# Patient Record
Sex: MALE | Race: BLACK OR AFRICAN AMERICAN | NOT HISPANIC OR LATINO | Employment: FULL TIME | URBAN - METROPOLITAN AREA
[De-identification: names, ages, dates, MRNs, and addresses within clinical notes are randomized per-mention and may not be internally consistent; named-entity substitution may affect disease eponyms.]

---

## 2018-04-23 ENCOUNTER — HOSPITAL ENCOUNTER (INPATIENT)
Facility: HOSPITAL | Age: 63
LOS: 2 days | Discharge: HOME | DRG: 282 | End: 2018-04-25
Attending: EMERGENCY MEDICINE | Admitting: INTERNAL MEDICINE
Payer: MEDICARE

## 2018-04-23 DIAGNOSIS — I25.10 CAD (CORONARY ARTERY DISEASE): ICD-10-CM

## 2018-04-23 DIAGNOSIS — I21.4 NSTEMI (NON-ST ELEVATED MYOCARDIAL INFARCTION): ICD-10-CM

## 2018-04-23 DIAGNOSIS — R79.89 ELEVATED TROPONIN: Primary | ICD-10-CM

## 2018-04-23 DIAGNOSIS — R07.9 CHEST PAIN: ICD-10-CM

## 2018-04-23 DIAGNOSIS — I25.118 CORONARY ARTERY DISEASE INVOLVING NATIVE CORONARY ARTERY OF NATIVE HEART WITH OTHER FORM OF ANGINA PECTORIS: ICD-10-CM

## 2018-04-23 LAB
ALBUMIN SERPL BCP-MCNC: 3.6 G/DL
ALP SERPL-CCNC: 78 U/L
ALT SERPL W/O P-5'-P-CCNC: 7 U/L
ANION GAP SERPL CALC-SCNC: 9 MMOL/L
AST SERPL-CCNC: 12 U/L
BASOPHILS # BLD AUTO: 0.07 K/UL
BASOPHILS NFR BLD: 0.7 %
BILIRUB SERPL-MCNC: 0.4 MG/DL
BNP SERPL-MCNC: 116 PG/ML
BUN SERPL-MCNC: 13 MG/DL
CALCIUM SERPL-MCNC: 9.5 MG/DL
CHLORIDE SERPL-SCNC: 102 MMOL/L
CO2 SERPL-SCNC: 25 MMOL/L
CREAT SERPL-MCNC: 0.9 MG/DL
DIFFERENTIAL METHOD: ABNORMAL
EOSINOPHIL # BLD AUTO: 0.3 K/UL
EOSINOPHIL NFR BLD: 2.5 %
ERYTHROCYTE [DISTWIDTH] IN BLOOD BY AUTOMATED COUNT: 13.7 %
EST. GFR  (AFRICAN AMERICAN): >60 ML/MIN/1.73 M^2
EST. GFR  (NON AFRICAN AMERICAN): >60 ML/MIN/1.73 M^2
GLUCOSE SERPL-MCNC: 91 MG/DL
HCT VFR BLD AUTO: 40.6 %
HGB BLD-MCNC: 13.1 G/DL
LYMPHOCYTES # BLD AUTO: 2.9 K/UL
LYMPHOCYTES NFR BLD: 28.9 %
MCH RBC QN AUTO: 27.4 PG
MCHC RBC AUTO-ENTMCNC: 32.3 G/DL
MCV RBC AUTO: 85 FL
MONOCYTES # BLD AUTO: 0.8 K/UL
MONOCYTES NFR BLD: 7.6 %
NEUTROPHILS # BLD AUTO: 6 K/UL
NEUTROPHILS NFR BLD: 60.2 %
PLATELET # BLD AUTO: 285 K/UL
PMV BLD AUTO: 9.3 FL
POTASSIUM SERPL-SCNC: 4 MMOL/L
PROT SERPL-MCNC: 7.8 G/DL
RBC # BLD AUTO: 4.78 M/UL
SODIUM SERPL-SCNC: 136 MMOL/L
TROPONIN I SERPL DL<=0.01 NG/ML-MCNC: 0.04 NG/ML
TROPONIN I SERPL DL<=0.01 NG/ML-MCNC: 0.87 NG/ML
TROPONIN I SERPL DL<=0.01 NG/ML-MCNC: 0.87 NG/ML
WBC # BLD AUTO: 10.03 K/UL

## 2018-04-23 PROCEDURE — 83880 ASSAY OF NATRIURETIC PEPTIDE: CPT

## 2018-04-23 PROCEDURE — 99285 EMERGENCY DEPT VISIT HI MDM: CPT | Mod: 25

## 2018-04-23 PROCEDURE — A4216 STERILE WATER/SALINE, 10 ML: HCPCS | Performed by: EMERGENCY MEDICINE

## 2018-04-23 PROCEDURE — 11000001 HC ACUTE MED/SURG PRIVATE ROOM

## 2018-04-23 PROCEDURE — 84484 ASSAY OF TROPONIN QUANT: CPT | Mod: 91

## 2018-04-23 PROCEDURE — 85025 COMPLETE CBC W/AUTO DIFF WBC: CPT

## 2018-04-23 PROCEDURE — 25000003 PHARM REV CODE 250: Performed by: INTERNAL MEDICINE

## 2018-04-23 PROCEDURE — 96372 THER/PROPH/DIAG INJ SC/IM: CPT

## 2018-04-23 PROCEDURE — 80053 COMPREHEN METABOLIC PANEL: CPT

## 2018-04-23 PROCEDURE — 93005 ELECTROCARDIOGRAM TRACING: CPT

## 2018-04-23 PROCEDURE — 94761 N-INVAS EAR/PLS OXIMETRY MLT: CPT

## 2018-04-23 PROCEDURE — 63600175 PHARM REV CODE 636 W HCPCS: Performed by: INTERNAL MEDICINE

## 2018-04-23 PROCEDURE — 99223 1ST HOSP IP/OBS HIGH 75: CPT | Mod: ,,, | Performed by: INTERNAL MEDICINE

## 2018-04-23 PROCEDURE — 25000003 PHARM REV CODE 250: Performed by: EMERGENCY MEDICINE

## 2018-04-23 PROCEDURE — 84484 ASSAY OF TROPONIN QUANT: CPT

## 2018-04-23 PROCEDURE — 36415 COLL VENOUS BLD VENIPUNCTURE: CPT

## 2018-04-23 RX ORDER — ATORVASTATIN CALCIUM 40 MG/1
80 TABLET, FILM COATED ORAL NIGHTLY
Status: DISCONTINUED | OUTPATIENT
Start: 2018-04-23 | End: 2018-04-25 | Stop reason: HOSPADM

## 2018-04-23 RX ORDER — METOPROLOL TARTRATE 50 MG/1
50 TABLET ORAL
Status: COMPLETED | OUTPATIENT
Start: 2018-04-23 | End: 2018-04-23

## 2018-04-23 RX ORDER — ENOXAPARIN SODIUM 100 MG/ML
80 INJECTION SUBCUTANEOUS ONCE
Status: COMPLETED | OUTPATIENT
Start: 2018-04-23 | End: 2018-04-23

## 2018-04-23 RX ORDER — NITROGLYCERIN 0.4 MG/1
0.4 TABLET SUBLINGUAL EVERY 5 MIN PRN
Status: DISCONTINUED | OUTPATIENT
Start: 2018-04-23 | End: 2018-04-25 | Stop reason: HOSPADM

## 2018-04-23 RX ORDER — METOPROLOL TARTRATE 50 MG/1
50 TABLET ORAL 2 TIMES DAILY
Status: DISCONTINUED | OUTPATIENT
Start: 2018-04-24 | End: 2018-04-25 | Stop reason: HOSPADM

## 2018-04-23 RX ORDER — ASPIRIN 325 MG
325 TABLET ORAL ONCE
Status: COMPLETED | OUTPATIENT
Start: 2018-04-23 | End: 2018-04-23

## 2018-04-23 RX ORDER — CLOPIDOGREL BISULFATE 75 MG/1
300 TABLET ORAL
Status: COMPLETED | OUTPATIENT
Start: 2018-04-23 | End: 2018-04-23

## 2018-04-23 RX ORDER — SODIUM CHLORIDE 0.9 % (FLUSH) 0.9 %
3 SYRINGE (ML) INJECTION EVERY 8 HOURS
Status: DISCONTINUED | OUTPATIENT
Start: 2018-04-23 | End: 2018-04-25 | Stop reason: HOSPADM

## 2018-04-23 RX ORDER — ASPIRIN 325 MG
325 TABLET ORAL
Status: COMPLETED | OUTPATIENT
Start: 2018-04-23 | End: 2018-04-23

## 2018-04-23 RX ORDER — LISINOPRIL 10 MG/1
10 TABLET ORAL DAILY
Status: DISCONTINUED | OUTPATIENT
Start: 2018-04-24 | End: 2018-04-24

## 2018-04-23 RX ADMIN — SODIUM CHLORIDE, PRESERVATIVE FREE 3 ML: 5 INJECTION INTRAVENOUS at 02:04

## 2018-04-23 RX ADMIN — ENOXAPARIN SODIUM 80 MG: 100 INJECTION SUBCUTANEOUS at 06:04

## 2018-04-23 RX ADMIN — ASPIRIN 325 MG ORAL TABLET 325 MG: 325 PILL ORAL at 01:04

## 2018-04-23 RX ADMIN — CLOPIDOGREL BISULFATE 300 MG: 75 TABLET ORAL at 01:04

## 2018-04-23 RX ADMIN — ATORVASTATIN CALCIUM 80 MG: 40 TABLET, FILM COATED ORAL at 08:04

## 2018-04-23 RX ADMIN — ASPIRIN 325 MG ORAL TABLET 325 MG: 325 PILL ORAL at 11:04

## 2018-04-23 RX ADMIN — METOPROLOL TARTRATE 50 MG: 50 TABLET ORAL at 03:04

## 2018-04-23 RX ADMIN — NITROGLYCERIN 1 INCH: 20 OINTMENT TOPICAL at 11:04

## 2018-04-23 RX ADMIN — SODIUM CHLORIDE, PRESERVATIVE FREE 3 ML: 5 INJECTION INTRAVENOUS at 09:04

## 2018-04-23 NOTE — ED PROVIDER NOTES
Encounter Date: 4/23/2018    SCRIBE #1 NOTE: I, Fransico Napoles, am scribing for, and in the presence of,  Dr. Autumn Sanz. I have scribed the entire note.       History     Chief Complaint   Patient presents with    Chest Pain     chest discomfort that began this morning with right shoulder pain.  Hx of MI x 2 with cardiac stents.  Took 3 NTG at home with some relief      aWlter Gold is a 62 y.o. male who  has a past medical history of Coronary artery disease; Hyperlipidemia; and Hypertension.    The patient presents to the ED due to chest pain. This morning, pt felt right shoulder discomfort which radiates to his chest. He has had 2 MI before (2007, 2015), and states pain feels similar. Sensation is discomfort more than pain. He felt associated nausea, denies diaphoresis or SOB. Discomfort continues to present and first began 2 hours PTA. He has taken 3 NTG with some initial relief, however discomfort has returned intermittently. Pt is a smoker, light alcohol (1-2 drinks per day). Non-drug user. His cardiologist is Dr. Spann at [place]. PCP Dr. Clark.       The history is provided by the patient and the spouse.     Review of patient's allergies indicates:   Allergen Reactions    Losartan      Causes coughing, not certain if an allergy     Past Medical History:   Diagnosis Date    Coronary artery disease     Hyperlipidemia     Hypertension      History reviewed. No pertinent surgical history.  Family History   Problem Relation Age of Onset    Hyperlipidemia Mother     Heart disease Brother      Social History   Substance Use Topics    Smoking status: Former Smoker     Packs/day: 0.50     Types: Cigarettes     Start date: 6/25/2014    Smokeless tobacco: Not on file    Alcohol use Yes     Review of Systems   Constitutional: Negative for fever.   HENT: Negative for sore throat.    Respiratory: Negative for shortness of breath.    Cardiovascular: Positive for chest pain.   Gastrointestinal:  Positive for nausea.   Genitourinary: Negative for dysuria.   Musculoskeletal: Negative for back pain.        R shoulder discomfort   Skin: Negative for rash.   Neurological: Negative for weakness.   Hematological: Does not bruise/bleed easily.       Physical Exam     Initial Vitals [04/23/18 1114]   BP Pulse Resp Temp SpO2   (!) 182/93 89 16 98.6 °F (37 °C) 100 %      MAP       122.67         Physical Exam    Nursing note and vitals reviewed.  Constitutional: He appears well-developed and well-nourished.   HENT:   Head: Normocephalic and atraumatic.   Eyes: EOM are normal. Pupils are equal, round, and reactive to light.   Neck: Normal range of motion. Neck supple.   Cardiovascular: Normal rate, regular rhythm, normal heart sounds and intact distal pulses.   Pulmonary/Chest: Breath sounds normal.   Abdominal: Soft. Bowel sounds are normal. He exhibits no distension. There is no tenderness. There is no rebound and no guarding.   Musculoskeletal: Normal range of motion. He exhibits no edema.   Neurological: He is alert and oriented to person, place, and time.   Skin: Skin is warm and dry.   hyperpigmented raised growth to left chin that is approximately 4x3 cm   Psychiatric: He has a normal mood and affect. His behavior is normal. Judgment and thought content normal.         ED Course   Procedures  Labs Reviewed   CBC W/ AUTO DIFFERENTIAL - Abnormal; Notable for the following:        Result Value    Hemoglobin 13.1 (*)     All other components within normal limits   COMPREHENSIVE METABOLIC PANEL - Abnormal; Notable for the following:     ALT 7 (*)     All other components within normal limits   TROPONIN I - Abnormal; Notable for the following:     Troponin I 0.039 (*)     All other components within normal limits   B-TYPE NATRIURETIC PEPTIDE - Abnormal; Notable for the following:      (*)     All other components within normal limits   TROPONIN I   TROPONIN I     EKG Readings: (Independently Interpreted)    Rhythm: Sinus Arrhythmia. Heart Rate: 91. Ectopy: No Ectopy. Conduction: Normal. Axis: Left Axis Deviation. Other Impression: peaked T waves, LVH. No ST changes   1106:          Medical Decision Making:   History:   Old Medical Records: I decided to obtain old medical records.  Initial Assessment:   63 y/o male presents with R shoulder discomfort with radiation to the chest with associated nausea. Pt has taken 3x NTG with some relief. He was hx of MI. Will order cardiac workup   Clinical Tests:   Lab Tests: Ordered and Reviewed  The following lab test(s) were unremarkable: CBC, CMP, Troponin, BNP and Urinalysis  Radiological Study: Ordered and Reviewed  Medical Tests: Ordered and Reviewed  ED Management:  Patient with a positive troponin.  EKG does not show evidence of acute STEMI.  Chest x-ray shows possible enlarged thyroid gland radiologist recommends ultrasound.  Case discussed with Dr. Collazo, he will admit the patient to his service. Orders written.                      Clinical Impression:   The primary encounter diagnosis was Elevated troponin. A diagnosis of Chest pain was also pertinent to this visit.          I, Autumn Sanz, personally performed the services described in this documentation. All medical record entries made by the scribe were at my direction and in my presence.  I have reviewed the chart and agree that the record reflects my personal performance and is accurate and complete. Autumn Sanz M.D. 1:03 PM04/23/2018                 Autumn Sanz MD  04/23/18 4595       Autumn Sanz MD  04/23/18 5108

## 2018-04-23 NOTE — ASSESSMENT & PLAN NOTE
Returning for NSTEMI      [facility] in am  R radial   AMANUEL candidate      Risks, benefits and alternatives of cardiac catheterization and possible intervention were discussed with the patient. All questions were answered and informed consent obtained.     I discussed the importance of compliance with dual antiplatelet therapy with the patient to prevent acute or late stent thrombosis with premature discontinuation of the therapy.      Patient is a candidate for drug eluting stents.     Verbally the patient has expressed full understanding of the clinical importance of dual antiplatelet therapy compliance. Drug eluting stents require a minimum of 12 months of dual anti-platelet therapy.

## 2018-04-23 NOTE — NURSING
Patient arrived to  via stretcher with ER RN staff. Family at patient 's bedside. IV site dressing c/d/i. At present no distress noted. Tele monitor applied as per orders. For further data refer to admission assessment data sheets. Will cont to monitor pt and intervene prn.

## 2018-04-23 NOTE — ED NOTES
Pt here c/o chest pain that spans both sides of upper chest-described as similar to previous chest pain in the past-has had 2 MI. Pt denies nausea today. Denies urinary s/s. Skin is warm/dry/normal coloring for ethnicity. Pt is AAOx3,resp are regular and unlabored. Pt denies SOB. abd is soft and nontender.

## 2018-04-23 NOTE — CONSULTS
[facility]  Cardiology  Consult Note    Patient Name: Walter Gold  MRN: [id number]  Admission Date: 4/23/2018  Hospital Length of Stay: 0 days  Code Status: Full Code    Attending Provider:  Chaparro Collazo  Consulting Provider: Chaparro Collazo MD  Primary Care Physician: Joselin Clark MD  Principal Problem:<principal problem not specified>    Patient information was obtained from chart and patient      Consults  Subjective:     Chief Complaint:  Chest pain    HPI:   62 year old male with h/o HTN, HLP, CAD s/p RCA PCI twice, LCx , tobacco, and non comp compliance with medications presenting with chest pain with abnormal TNI. Last AMI and PCI 2015 4.0 x 26 mm Resolute AMANUEL in RCA.       Pain free in the ED after aspirin, plavix load, NTG paste, and lovenox.       Risks, benefits and alternatives of cardiac catheterization and possible intervention were discussed with the patient. All questions were answered and informed consent obtained.     I discussed the importance of compliance with dual antiplatelet therapy with the patient to prevent acute or late stent thrombosis with premature discontinuation of the therapy.            Past Medical History:   Diagnosis Date    Coronary artery disease     Hyperlipidemia     Hypertension        History reviewed. No pertinent surgical history.    Review of patient's allergies indicates:   Allergen Reactions    Losartan      Causes coughing, not certain if an allergy       No current facility-administered medications on file prior to encounter.      Current Outpatient Prescriptions on File Prior to Encounter   Medication Sig    nitroGLYCERIN (NITROSTAT) 0.4 MG SL tablet Place 1 tablet (0.4 mg total) under the tongue every 5 (five) minutes as needed for Chest pain.    aspirin 81 MG Chew Take 81 mg by mouth once daily.    benazepril (LOTENSIN) 20 MG tablet Take 20 mg by mouth once daily.    carvedilol (COREG) 6.25 MG tablet Take 1 tablet (6.25 mg total) by  mouth 2 (two) times daily with meals.    fenofibrate 160 MG Tab Take 1 tablet (160 mg total) by mouth once daily.    multivitamin (ONE DAILY MULTIVITAMIN) per tablet Take 1 tablet by mouth once daily.    ticagrelor (BRILINTA) 90 mg tablet Take 1 tablet (90 mg total) by mouth 2 (two) times daily.     Family History     Problem Relation (Age of Onset)    Heart disease Brother    Hyperlipidemia Mother        Social History Main Topics    Smoking status: Former Smoker     Packs/day: 0.50     Types: Cigarettes     Start date: 6/25/2014    Smokeless tobacco: Not on file    Alcohol use Yes    Drug use: No    Sexual activity: Not on file     Review of Systems   Constitution: Negative for diaphoresis, weakness, night sweats, weight gain and weight loss.   HENT: Negative for congestion.    Eyes: Negative for blurred vision, discharge and double vision.   Cardiovascular: Positive for chest pain. Negative for claudication, cyanosis, dyspnea on exertion, irregular heartbeat, leg swelling, near-syncope, orthopnea, palpitations, paroxysmal nocturnal dyspnea and syncope.   Respiratory: Negative for cough, shortness of breath and wheezing.    Endocrine: Negative for cold intolerance, heat intolerance and polyphagia.   Hematologic/Lymphatic: Negative for adenopathy and bleeding problem. Does not bruise/bleed easily.   Skin: Negative for dry skin and nail changes.   Musculoskeletal: Negative for arthritis, back pain, falls, joint pain, myalgias and neck pain.   Gastrointestinal: Negative for bloating, abdominal pain, change in bowel habit and constipation.   Genitourinary: Negative for bladder incontinence, dysuria, flank pain, genital sores and missed menses.   Neurological: Negative for aphonia, brief paralysis, difficulty with concentration and dizziness.   Psychiatric/Behavioral: Negative for altered mental status and memory loss. The patient does not have insomnia.    Allergic/Immunologic: Negative for environmental  allergies.     Objective:     Vital Signs (Most Recent):  Temp: 98.6 °F (37 °C) (04/23/18 1114)  Pulse: 89 (04/23/18 1537)  Resp: 20 (04/23/18 1537)  BP: (!) 154/89 (04/23/18 1537)  SpO2: 100 % (04/23/18 1537) Vital Signs (24h Range):  Temp:  [98.6 °F (37 °C)] 98.6 °F (37 °C)  Pulse:  [75-89] 89  Resp:  [16-20] 20  SpO2:  [99 %-100 %] 100 %  BP: (154-182)/(69-93) 154/89     Weight: 79.4 kg (175 lb)  Body mass index is 25.84 kg/m².    SpO2: 100 %  O2 Device (Oxygen Therapy): room air    No intake or output data in the 24 hours ending 04/23/18 1615    Lines/Drains/Airways     Peripheral Intravenous Line                 Peripheral IV - Single Lumen 04/23/18 1145 Left Antecubital less than 1 day                Physical Exam   Constitutional: He is oriented to person, place, and time. He appears well-developed and well-nourished. He is not intubated.   HENT:   Head: Normocephalic and atraumatic.   Right Ear: External ear normal.   Left Ear: External ear normal.   Mouth/Throat: Oropharynx is clear and moist.   Eyes: Conjunctivae and EOM are normal. Pupils are equal, round, and reactive to light. Right eye exhibits no discharge. Left eye exhibits no discharge. No scleral icterus.   Neck: Normal range of motion. Neck supple. Normal carotid pulses, no hepatojugular reflux and no JVD present. Carotid bruit is not present. No tracheal deviation present. No thyromegaly present.   Cardiovascular: Normal rate, regular rhythm, S1 normal and S2 normal.   No extrasystoles are present. PMI is not displaced.  Exam reveals no gallop, no S3, no distant heart sounds, no friction rub and no midsystolic click.    No murmur heard.  Pulses:       Carotid pulses are 2+ on the right side, and 2+ on the left side.       Radial pulses are 2+ on the right side, and 2+ on the left side.        Femoral pulses are 2+ on the right side, and 2+ on the left side.       Popliteal pulses are 2+ on the right side, and 2+ on the left side.        Dorsalis  pedis pulses are 2+ on the right side, and 2+ on the left side.        Posterior tibial pulses are 2+ on the right side, and 2+ on the left side.   Pulmonary/Chest: Effort normal and breath sounds normal. No accessory muscle usage or stridor. No apnea, no tachypnea and no bradypnea. He is not intubated. No respiratory distress. He has no decreased breath sounds. He has no wheezes. He has no rales. He exhibits no tenderness and no bony tenderness.   Abdominal: He exhibits no distension, no pulsatile liver, no abdominal bruit, no ascites, no pulsatile midline mass and no mass. There is no tenderness. There is no rebound and no guarding.   Musculoskeletal: Normal range of motion. He exhibits no edema or tenderness.   Lymphadenopathy:     He has no cervical adenopathy.   Neurological: He is alert and oriented to person, place, and time. He has normal reflexes. No cranial nerve deficit. Coordination normal.   Skin: Skin is warm. No rash noted. No erythema. No pallor.   Psychiatric: He has a normal mood and affect. His behavior is normal. Judgment and thought content normal.       Significant Labs:       LABS  CBC    Recent Labs  Lab 04/23/18  1149   WBC 10.03   RBC 4.78   HGB 13.1*   HCT 40.6      MCV 85   MCH 27.4   MCHC 32.3     BMP    Recent Labs  Lab 04/23/18  1149      K 4.0   CO2 25      BUN 13   CREATININE 0.9   GLU 91       POCT-Glucose  No results found for: POCTGLUCOSE      Recent Labs  Lab 04/23/18  1149   CALCIUM 9.5     LFT    Recent Labs  Lab 04/23/18  1149   PROT 7.8   ALBUMIN 3.6   BILITOT 0.4   AST 12   ALKPHOS 78   ALT 7*     GFR     COAGS  No results for input(s): PT, INR, APTT in the last 168 hours.  CE    Recent Labs  Lab 04/23/18  1149   TROPONINI 0.039*     ABGs  No results for input(s): PH, PCO2, PO2, HCO3, POCSATURATED, BE in the last 24 hours.  BNP    Recent Labs  Lab 04/23/18  1149   *       LAST HbA1c  No results found for: HGBA1C    Lipid panel  Lab Results    Component Value Date    CHOL 291 (H) 06/21/2015     Lab Results   Component Value Date    HDL 39 (L) 06/21/2015     Lab Results   Component Value Date    LDLCALC 223.8 (H) 06/21/2015     Lab Results   Component Value Date    TRIG 141 06/21/2015     Lab Results   Component Value Date    CHOLHDL 13.4 (L) 06/21/2015          Significant Imaging:       Imaging Results          X-Ray Chest AP Portable (Final result)  Result time 04/23/18 12:02:13    Final result by Bertram Hernández MD (04/23/18 12:02:13)                 Impression:      Possible right thyromegaly, consider elective thyroid ultrasound.  Chest otherwise unchanged, normal.      Electronically signed by: Bertram Hernández MD  Date:    04/23/2018  Time:    12:02             Narrative:    EXAMINATION:  XR CHEST AP PORTABLE    CLINICAL HISTORY:  Chest Pain;    TECHNIQUE:  Single frontal view of the chest was performed.    COMPARISON:  Two thousand fifteen    FINDINGS:  Heart normal.  Lungs clear.  Bony thorax stable, normal.  Displacement trachea right, at suprasternal notch, right thyromegaly question.                                  Assessment and Plan:     NSTEMI (non-ST elevated myocardial infarction)        Returning for NSTEMI      C in am  R radial   AMANUEL candidate      Risks, benefits and alternatives of cardiac catheterization and possible intervention were discussed with the patient. All questions were answered and informed consent obtained.     I discussed the importance of compliance with dual antiplatelet therapy with the patient to prevent acute or late stent thrombosis with premature discontinuation of the therapy.      Patient is a candidate for drug eluting stents.     Verbally the patient has expressed full understanding of the clinical importance of dual antiplatelet therapy compliance. Drug eluting stents require a minimum of 12 months of dual anti-platelet therapy.              VTE Risk Mitigation         Ordered     enoxaparin  injection 80 mg  Once      04/23/18 1611     IP VTE LOW RISK PATIENT  Once      04/23/18 1303          Smoking cessation  Resume statin therapy  Cardiac rehab II        Echo         Further recommendations to follow           Thank you for your consult.    Chaparro Collazo MD  Cardiology   [facility]

## 2018-04-23 NOTE — HPI
62 year old male with h/o HTN, HLP, CAD s/p RCA PCI twice, LCx , tobacco, and non comp compliance with medications presenting with chest pain with abnormal TNI. Last AMI and PCI 2015 4.0 x 26 mm Resolute AMANUEL in RCA.       Pain free in the ED after aspirin, plavix load, NTG paste, and lovenox.       Risks, benefits and alternatives of cardiac catheterization and possible intervention were discussed with the patient. All questions were answered and informed consent obtained.     I discussed the importance of compliance with dual antiplatelet therapy with the patient to prevent acute or late stent thrombosis with premature discontinuation of the therapy.

## 2018-04-23 NOTE — SUBJECTIVE & OBJECTIVE
Past Medical History:   Diagnosis Date    Coronary artery disease     Hyperlipidemia     Hypertension        History reviewed. No pertinent surgical history.    Review of patient's allergies indicates:   Allergen Reactions    Losartan      Causes coughing, not certain if an allergy       No current facility-administered medications on file prior to encounter.      Current Outpatient Prescriptions on File Prior to Encounter   Medication Sig    nitroGLYCERIN (NITROSTAT) 0.4 MG SL tablet Place 1 tablet (0.4 mg total) under the tongue every 5 (five) minutes as needed for Chest pain.    aspirin 81 MG Chew Take 81 mg by mouth once daily.    benazepril (LOTENSIN) 20 MG tablet Take 20 mg by mouth once daily.    carvedilol (COREG) 6.25 MG tablet Take 1 tablet (6.25 mg total) by mouth 2 (two) times daily with meals.    fenofibrate 160 MG Tab Take 1 tablet (160 mg total) by mouth once daily.    multivitamin (ONE DAILY MULTIVITAMIN) per tablet Take 1 tablet by mouth once daily.    ticagrelor (BRILINTA) 90 mg tablet Take 1 tablet (90 mg total) by mouth 2 (two) times daily.     Family History     Problem Relation (Age of Onset)    Heart disease Brother    Hyperlipidemia Mother        Social History Main Topics    Smoking status: Former Smoker     Packs/day: 0.50     Types: Cigarettes     Start date: 6/25/2014    Smokeless tobacco: Not on file    Alcohol use Yes    Drug use: No    Sexual activity: Not on file     Review of Systems   Constitution: Negative for diaphoresis, weakness, night sweats, weight gain and weight loss.   HENT: Negative for congestion.    Eyes: Negative for blurred vision, discharge and double vision.   Cardiovascular: Positive for chest pain. Negative for claudication, cyanosis, dyspnea on exertion, irregular heartbeat, leg swelling, near-syncope, orthopnea, palpitations, paroxysmal nocturnal dyspnea and syncope.   Respiratory: Negative for cough, shortness of breath and wheezing.     Endocrine: Negative for cold intolerance, heat intolerance and polyphagia.   Hematologic/Lymphatic: Negative for adenopathy and bleeding problem. Does not bruise/bleed easily.   Skin: Negative for dry skin and nail changes.   Musculoskeletal: Negative for arthritis, back pain, falls, joint pain, myalgias and neck pain.   Gastrointestinal: Negative for bloating, abdominal pain, change in bowel habit and constipation.   Genitourinary: Negative for bladder incontinence, dysuria, flank pain, genital sores and missed menses.   Neurological: Negative for aphonia, brief paralysis, difficulty with concentration and dizziness.   Psychiatric/Behavioral: Negative for altered mental status and memory loss. The patient does not have insomnia.    Allergic/Immunologic: Negative for environmental allergies.     Objective:     Vital Signs (Most Recent):  Temp: 98.6 °F (37 °C) (04/23/18 1114)  Pulse: 89 (04/23/18 1537)  Resp: 20 (04/23/18 1537)  BP: (!) 154/89 (04/23/18 1537)  SpO2: 100 % (04/23/18 1537) Vital Signs (24h Range):  Temp:  [98.6 °F (37 °C)] 98.6 °F (37 °C)  Pulse:  [75-89] 89  Resp:  [16-20] 20  SpO2:  [99 %-100 %] 100 %  BP: (154-182)/(69-93) 154/89     Weight: 79.4 kg (175 lb)  Body mass index is 25.84 kg/m².    SpO2: 100 %  O2 Device (Oxygen Therapy): room air    No intake or output data in the 24 hours ending 04/23/18 1615    Lines/Drains/Airways     Peripheral Intravenous Line                 Peripheral IV - Single Lumen 04/23/18 1145 Left Antecubital less than 1 day                Physical Exam   Constitutional: He is oriented to person, place, and time. He appears well-developed and well-nourished. He is not intubated.   HENT:   Head: Normocephalic and atraumatic.   Right Ear: External ear normal.   Left Ear: External ear normal.   Mouth/Throat: Oropharynx is clear and moist.   Eyes: Conjunctivae and EOM are normal. Pupils are equal, round, and reactive to light. Right eye exhibits no discharge. Left eye  exhibits no discharge. No scleral icterus.   Neck: Normal range of motion. Neck supple. Normal carotid pulses, no hepatojugular reflux and no JVD present. Carotid bruit is not present. No tracheal deviation present. No thyromegaly present.   Cardiovascular: Normal rate, regular rhythm, S1 normal and S2 normal.   No extrasystoles are present. PMI is not displaced.  Exam reveals no gallop, no S3, no distant heart sounds, no friction rub and no midsystolic click.    No murmur heard.  Pulses:       Carotid pulses are 2+ on the right side, and 2+ on the left side.       Radial pulses are 2+ on the right side, and 2+ on the left side.        Femoral pulses are 2+ on the right side, and 2+ on the left side.       Popliteal pulses are 2+ on the right side, and 2+ on the left side.        Dorsalis pedis pulses are 2+ on the right side, and 2+ on the left side.        Posterior tibial pulses are 2+ on the right side, and 2+ on the left side.   Pulmonary/Chest: Effort normal and breath sounds normal. No accessory muscle usage or stridor. No apnea, no tachypnea and no bradypnea. He is not intubated. No respiratory distress. He has no decreased breath sounds. He has no wheezes. He has no rales. He exhibits no tenderness and no bony tenderness.   Abdominal: He exhibits no distension, no pulsatile liver, no abdominal bruit, no ascites, no pulsatile midline mass and no mass. There is no tenderness. There is no rebound and no guarding.   Musculoskeletal: Normal range of motion. He exhibits no edema or tenderness.   Lymphadenopathy:     He has no cervical adenopathy.   Neurological: He is alert and oriented to person, place, and time. He has normal reflexes. No cranial nerve deficit. Coordination normal.   Skin: Skin is warm. No rash noted. No erythema. No pallor.   Psychiatric: He has a normal mood and affect. His behavior is normal. Judgment and thought content normal.       Significant Labs:       LABS  CBC    Recent Labs  Lab  04/23/18  1149   WBC 10.03   RBC 4.78   HGB 13.1*   HCT 40.6      MCV 85   MCH 27.4   MCHC 32.3     BMP    Recent Labs  Lab 04/23/18  1149      K 4.0   CO2 25      BUN 13   CREATININE 0.9   GLU 91       POCT-Glucose  No results found for: POCTGLUCOSE      Recent Labs  Lab 04/23/18  1149   CALCIUM 9.5     LFT    Recent Labs  Lab 04/23/18  1149   PROT 7.8   ALBUMIN 3.6   BILITOT 0.4   AST 12   ALKPHOS 78   ALT 7*     GFR     COAGS  No results for input(s): PT, INR, APTT in the last 168 hours.  CE    Recent Labs  Lab 04/23/18  1149   TROPONINI 0.039*     ABGs  No results for input(s): PH, PCO2, PO2, HCO3, POCSATURATED, BE in the last 24 hours.  BNP    Recent Labs  Lab 04/23/18  1149   *       LAST HbA1c  No results found for: HGBA1C    Lipid panel  Lab Results   Component Value Date    CHOL 291 (H) 06/21/2015     Lab Results   Component Value Date    HDL 39 (L) 06/21/2015     Lab Results   Component Value Date    LDLCALC 223.8 (H) 06/21/2015     Lab Results   Component Value Date    TRIG 141 06/21/2015     Lab Results   Component Value Date    CHOLHDL 13.4 (L) 06/21/2015          Significant Imaging:       Imaging Results          X-Ray Chest AP Portable (Final result)  Result time 04/23/18 12:02:13    Final result by Bertram Hernández MD (04/23/18 12:02:13)                 Impression:      Possible right thyromegaly, consider elective thyroid ultrasound.  Chest otherwise unchanged, normal.      Electronically signed by: Bertram Hernández MD  Date:    04/23/2018  Time:    12:02             Narrative:    EXAMINATION:  XR CHEST AP PORTABLE    CLINICAL HISTORY:  Chest Pain;    TECHNIQUE:  Single frontal view of the chest was performed.    COMPARISON:  Two thousand fifteen    FINDINGS:  Heart normal.  Lungs clear.  Bony thorax stable, normal.  Displacement trachea right, at suprasternal notch, right thyromegaly question.

## 2018-04-24 ENCOUNTER — SURGERY (OUTPATIENT)
Age: 63
End: 2018-04-24

## 2018-04-24 LAB
ANION GAP SERPL CALC-SCNC: 9 MMOL/L
BUN SERPL-MCNC: 12 MG/DL
CALCIUM SERPL-MCNC: 9.4 MG/DL
CHLORIDE SERPL-SCNC: 105 MMOL/L
CHOLEST SERPL-MCNC: 339 MG/DL
CHOLEST SERPL-MCNC: 339 MG/DL
CHOLEST/HDLC SERPL: 8.3 {RATIO}
CHOLEST/HDLC SERPL: 8.3 {RATIO}
CO2 SERPL-SCNC: 23 MMOL/L
CREAT SERPL-MCNC: 0.7 MG/DL
DIASTOLIC DYSFUNCTION: YES
ERYTHROCYTE [DISTWIDTH] IN BLOOD BY AUTOMATED COUNT: 13.5 %
EST. GFR  (AFRICAN AMERICAN): >60 ML/MIN/1.73 M^2
EST. GFR  (NON AFRICAN AMERICAN): >60 ML/MIN/1.73 M^2
ESTIMATED PA SYSTOLIC PRESSURE: 25.81
GLUCOSE SERPL-MCNC: 87 MG/DL
HCT VFR BLD AUTO: 40.2 %
HDLC SERPL-MCNC: 41 MG/DL
HDLC SERPL-MCNC: 41 MG/DL
HDLC SERPL: 12.1 %
HDLC SERPL: 12.1 %
HGB BLD-MCNC: 13.4 G/DL
LDLC SERPL CALC-MCNC: 278.8 MG/DL
LDLC SERPL CALC-MCNC: 278.8 MG/DL
MCH RBC QN AUTO: 28 PG
MCHC RBC AUTO-ENTMCNC: 33.3 G/DL
MCV RBC AUTO: 84 FL
MITRAL VALVE MOBILITY: NORMAL
NONHDLC SERPL-MCNC: 298 MG/DL
NONHDLC SERPL-MCNC: 298 MG/DL
PLATELET # BLD AUTO: 277 K/UL
PMV BLD AUTO: 8.8 FL
POTASSIUM SERPL-SCNC: 4 MMOL/L
RBC # BLD AUTO: 4.79 M/UL
RETIRED EF AND QEF - SEE NOTES: 20 (ref 55–65)
SODIUM SERPL-SCNC: 137 MMOL/L
TRICUSPID VALVE REGURGITATION: ABNORMAL
TRIGL SERPL-MCNC: 96 MG/DL
TRIGL SERPL-MCNC: 96 MG/DL
TROPONIN I SERPL DL<=0.01 NG/ML-MCNC: 1.67 NG/ML
WBC # BLD AUTO: 9.52 K/UL

## 2018-04-24 PROCEDURE — 11000001 HC ACUTE MED/SURG PRIVATE ROOM

## 2018-04-24 PROCEDURE — 4A033BC MEASUREMENT OF ARTERIAL PRESSURE, CORONARY, PERCUTANEOUS APPROACH: ICD-10-PCS | Performed by: INTERNAL MEDICINE

## 2018-04-24 PROCEDURE — 27200085 CATH LAB PROCEDURE

## 2018-04-24 PROCEDURE — 25000003 PHARM REV CODE 250

## 2018-04-24 PROCEDURE — 99152 MOD SED SAME PHYS/QHP 5/>YRS: CPT | Mod: ,,, | Performed by: INTERNAL MEDICINE

## 2018-04-24 PROCEDURE — 80048 BASIC METABOLIC PNL TOTAL CA: CPT

## 2018-04-24 PROCEDURE — 63600175 PHARM REV CODE 636 W HCPCS

## 2018-04-24 PROCEDURE — 25000003 PHARM REV CODE 250: Performed by: NURSE PRACTITIONER

## 2018-04-24 PROCEDURE — 85027 COMPLETE CBC AUTOMATED: CPT

## 2018-04-24 PROCEDURE — 80061 LIPID PANEL: CPT

## 2018-04-24 PROCEDURE — 93458 L HRT ARTERY/VENTRICLE ANGIO: CPT

## 2018-04-24 PROCEDURE — A4216 STERILE WATER/SALINE, 10 ML: HCPCS | Performed by: EMERGENCY MEDICINE

## 2018-04-24 PROCEDURE — 93306 TTE W/DOPPLER COMPLETE: CPT | Mod: 26,,, | Performed by: INTERNAL MEDICINE

## 2018-04-24 PROCEDURE — 93306 TTE W/DOPPLER COMPLETE: CPT

## 2018-04-24 PROCEDURE — 93005 ELECTROCARDIOGRAM TRACING: CPT

## 2018-04-24 PROCEDURE — 36415 COLL VENOUS BLD VENIPUNCTURE: CPT

## 2018-04-24 PROCEDURE — 4A023N7 MEASUREMENT OF CARDIAC SAMPLING AND PRESSURE, LEFT HEART, PERCUTANEOUS APPROACH: ICD-10-PCS | Performed by: INTERNAL MEDICINE

## 2018-04-24 PROCEDURE — 93571 IV DOP VEL&/PRESS C FLO 1ST: CPT | Mod: 26,RC,, | Performed by: INTERNAL MEDICINE

## 2018-04-24 PROCEDURE — 93458 L HRT ARTERY/VENTRICLE ANGIO: CPT | Mod: 26,,, | Performed by: INTERNAL MEDICINE

## 2018-04-24 PROCEDURE — 25500020 PHARM REV CODE 255

## 2018-04-24 PROCEDURE — 94761 N-INVAS EAR/PLS OXIMETRY MLT: CPT

## 2018-04-24 PROCEDURE — 25000003 PHARM REV CODE 250: Performed by: INTERNAL MEDICINE

## 2018-04-24 PROCEDURE — B2111ZZ FLUOROSCOPY OF MULTIPLE CORONARY ARTERIES USING LOW OSMOLAR CONTRAST: ICD-10-PCS | Performed by: INTERNAL MEDICINE

## 2018-04-24 PROCEDURE — 25000003 PHARM REV CODE 250: Performed by: EMERGENCY MEDICINE

## 2018-04-24 PROCEDURE — 93010 ELECTROCARDIOGRAM REPORT: CPT | Mod: ,,, | Performed by: INTERNAL MEDICINE

## 2018-04-24 PROCEDURE — 93571 IV DOP VEL&/PRESS C FLO 1ST: CPT | Mod: RC

## 2018-04-24 RX ORDER — ONDANSETRON 8 MG/1
8 TABLET, ORALLY DISINTEGRATING ORAL EVERY 8 HOURS PRN
Status: DISCONTINUED | OUTPATIENT
Start: 2018-04-24 | End: 2018-04-25 | Stop reason: HOSPADM

## 2018-04-24 RX ORDER — ASPIRIN 81 MG/1
81 TABLET ORAL DAILY
Status: DISCONTINUED | OUTPATIENT
Start: 2018-04-24 | End: 2018-04-25 | Stop reason: HOSPADM

## 2018-04-24 RX ORDER — ACETAMINOPHEN 325 MG/1
650 TABLET ORAL EVERY 4 HOURS PRN
Status: DISCONTINUED | OUTPATIENT
Start: 2018-04-24 | End: 2018-04-25 | Stop reason: HOSPADM

## 2018-04-24 RX ORDER — SODIUM CHLORIDE 9 MG/ML
INJECTION, SOLUTION INTRAVENOUS CONTINUOUS
Status: DISCONTINUED | OUTPATIENT
Start: 2018-04-24 | End: 2018-04-25 | Stop reason: HOSPADM

## 2018-04-24 RX ORDER — LISINOPRIL 20 MG/1
20 TABLET ORAL DAILY
Status: DISCONTINUED | OUTPATIENT
Start: 2018-04-25 | End: 2018-04-25 | Stop reason: HOSPADM

## 2018-04-24 RX ORDER — DIPHENHYDRAMINE HCL 50 MG
50 CAPSULE ORAL
Status: DISCONTINUED | OUTPATIENT
Start: 2018-04-24 | End: 2018-04-25 | Stop reason: HOSPADM

## 2018-04-24 RX ORDER — DIPHENHYDRAMINE HCL 25 MG
50 CAPSULE ORAL ONCE
Status: DISCONTINUED | OUTPATIENT
Start: 2018-04-24 | End: 2018-04-24

## 2018-04-24 RX ORDER — HYDROCODONE BITARTRATE AND ACETAMINOPHEN 5; 325 MG/1; MG/1
1 TABLET ORAL EVERY 4 HOURS PRN
Status: DISCONTINUED | OUTPATIENT
Start: 2018-04-24 | End: 2018-04-25 | Stop reason: HOSPADM

## 2018-04-24 RX ORDER — HYDRALAZINE HYDROCHLORIDE 20 MG/ML
10 INJECTION INTRAMUSCULAR; INTRAVENOUS EVERY 6 HOURS PRN
Status: DISCONTINUED | OUTPATIENT
Start: 2018-04-24 | End: 2018-04-25 | Stop reason: HOSPADM

## 2018-04-24 RX ORDER — CLOPIDOGREL BISULFATE 75 MG/1
75 TABLET ORAL DAILY
Status: DISCONTINUED | OUTPATIENT
Start: 2018-04-24 | End: 2018-04-25 | Stop reason: HOSPADM

## 2018-04-24 RX ADMIN — SODIUM CHLORIDE, PRESERVATIVE FREE 3 ML: 5 INJECTION INTRAVENOUS at 06:04

## 2018-04-24 RX ADMIN — CLOPIDOGREL BISULFATE 75 MG: 75 TABLET ORAL at 09:04

## 2018-04-24 RX ADMIN — METOPROLOL TARTRATE 50 MG: 50 TABLET ORAL at 09:04

## 2018-04-24 RX ADMIN — SODIUM CHLORIDE: 0.9 INJECTION, SOLUTION INTRAVENOUS at 06:04

## 2018-04-24 RX ADMIN — SODIUM CHLORIDE 3 ML/KG/HR: 0.9 INJECTION, SOLUTION INTRAVENOUS at 07:04

## 2018-04-24 RX ADMIN — METOPROLOL TARTRATE 50 MG: 50 TABLET ORAL at 08:04

## 2018-04-24 RX ADMIN — ASPIRIN 81 MG: 81 TABLET, COATED ORAL at 09:04

## 2018-04-24 RX ADMIN — HYDROCODONE BITARTRATE AND ACETAMINOPHEN 1 TABLET: 5; 325 TABLET ORAL at 08:04

## 2018-04-24 RX ADMIN — LISINOPRIL 10 MG: 10 TABLET ORAL at 09:04

## 2018-04-24 NOTE — INTERVAL H&P NOTE
The patient has been examined and the H&P has been reviewed:        Anesthesia/Surgery risks, benefits and alternative options discussed and understood by patient/family.          Active Hospital Problems    Diagnosis  POA    Elevated troponin [R74.8]  Yes    Chest pain [R07.9]  Yes    NSTEMI (non-ST elevated myocardial infarction) [I21.4]  Yes    CAD (coronary artery disease) [I25.10]  Yes     4.0 x 26 mm Resolute AMANUEL for AMI 6/20/2015  4.5 x 24 mm Liberte BMS for AMI 7/27/2007 by Dr. Melchor    -[facility]  -[facility] (6/20/2015) LVEDP 25mmHg; LVEF 50%; LM LIs; LAD LIs; mcirc 80%; RCA occl w/ISR    dRCA thrombectomy; PTCA and 4.0x26mm Resolute stent      Tobacco abuse [Z72.0]  Yes    Hyperlipidemia LDL goal <70 [E78.5]  Yes    Hypertension [I10]  Yes      Resolved Hospital Problems    Diagnosis Date Resolved POA   No resolved problems to display.

## 2018-04-24 NOTE — PROGRESS NOTES
.Pharmacy New Medication Education    Patient accepted medication education.    Pharmacy educated patient on name and purpose of medications and possible side effects, using the teach-back method.     Current Inpatient Medication Orders   0.9% NaCl infusion   ampicillin-sulbactam 3 g in sodium chloride 0.9 % 100 mL IVPB (ready to mix system)   aspirin EC tablet 81 mg   atorvastatin tablet 40 mg   clopidogrel tablet 75 mg   enoxaparin injection 40 mg   hydrocodone-acetaminophen 10-325mg per tablet 1 tablet   influenza (FLULAVAL,FLUZONE,FLUARIX QUADRIVALENT) vaccine 0.5 mL   isosorbide mononitrate 24 hr tablet 30 mg   sodium chloride 0.9% flush 5 mL   Tdap vaccine injection 0.5 mL       Learners of pharmacy medication education included:  Patient    Patient +/- learner response:  Verbalized Understanding, Teachback

## 2018-04-24 NOTE — H&P (VIEW-ONLY)
[facility]  Cardiology  Consult Note    Patient Name: Walter Gold  MRN: [id number]  Admission Date: 4/23/2018  Hospital Length of Stay: 0 days  Code Status: Full Code    Attending Provider:  Chaparro Collazo  Consulting Provider: Chaparro Collazo MD  Primary Care Physician: Joselin Clark MD  Principal Problem:<principal problem not specified>    Patient information was obtained from chart and patient      Consults  Subjective:     Chief Complaint:  Chest pain    HPI:   62 year old male with h/o HTN, HLP, CAD s/p RCA PCI twice, LCx , tobacco, and non comp compliance with medications presenting with chest pain with abnormal TNI. Last AMI and PCI 2015 4.0 x 26 mm Resolute AMANUEL in RCA.       Pain free in the ED after aspirin, plavix load, NTG paste, and lovenox.       Risks, benefits and alternatives of cardiac catheterization and possible intervention were discussed with the patient. All questions were answered and informed consent obtained.     I discussed the importance of compliance with dual antiplatelet therapy with the patient to prevent acute or late stent thrombosis with premature discontinuation of the therapy.            Past Medical History:   Diagnosis Date    Coronary artery disease     Hyperlipidemia     Hypertension        History reviewed. No pertinent surgical history.    Review of patient's allergies indicates:   Allergen Reactions    Losartan      Causes coughing, not certain if an allergy       No current facility-administered medications on file prior to encounter.      Current Outpatient Prescriptions on File Prior to Encounter   Medication Sig    nitroGLYCERIN (NITROSTAT) 0.4 MG SL tablet Place 1 tablet (0.4 mg total) under the tongue every 5 (five) minutes as needed for Chest pain.    aspirin 81 MG Chew Take 81 mg by mouth once daily.    benazepril (LOTENSIN) 20 MG tablet Take 20 mg by mouth once daily.    carvedilol (COREG) 6.25 MG tablet Take 1 tablet (6.25 mg total) by  mouth 2 (two) times daily with meals.    fenofibrate 160 MG Tab Take 1 tablet (160 mg total) by mouth once daily.    multivitamin (ONE DAILY MULTIVITAMIN) per tablet Take 1 tablet by mouth once daily.    ticagrelor (BRILINTA) 90 mg tablet Take 1 tablet (90 mg total) by mouth 2 (two) times daily.     Family History     Problem Relation (Age of Onset)    Heart disease Brother    Hyperlipidemia Mother        Social History Main Topics    Smoking status: Former Smoker     Packs/day: 0.50     Types: Cigarettes     Start date: 6/25/2014    Smokeless tobacco: Not on file    Alcohol use Yes    Drug use: No    Sexual activity: Not on file     Review of Systems   Constitution: Negative for diaphoresis, weakness, night sweats, weight gain and weight loss.   HENT: Negative for congestion.    Eyes: Negative for blurred vision, discharge and double vision.   Cardiovascular: Positive for chest pain. Negative for claudication, cyanosis, dyspnea on exertion, irregular heartbeat, leg swelling, near-syncope, orthopnea, palpitations, paroxysmal nocturnal dyspnea and syncope.   Respiratory: Negative for cough, shortness of breath and wheezing.    Endocrine: Negative for cold intolerance, heat intolerance and polyphagia.   Hematologic/Lymphatic: Negative for adenopathy and bleeding problem. Does not bruise/bleed easily.   Skin: Negative for dry skin and nail changes.   Musculoskeletal: Negative for arthritis, back pain, falls, joint pain, myalgias and neck pain.   Gastrointestinal: Negative for bloating, abdominal pain, change in bowel habit and constipation.   Genitourinary: Negative for bladder incontinence, dysuria, flank pain, genital sores and missed menses.   Neurological: Negative for aphonia, brief paralysis, difficulty with concentration and dizziness.   Psychiatric/Behavioral: Negative for altered mental status and memory loss. The patient does not have insomnia.    Allergic/Immunologic: Negative for environmental  allergies.     Objective:     Vital Signs (Most Recent):  Temp: 98.6 °F (37 °C) (04/23/18 1114)  Pulse: 89 (04/23/18 1537)  Resp: 20 (04/23/18 1537)  BP: (!) 154/89 (04/23/18 1537)  SpO2: 100 % (04/23/18 1537) Vital Signs (24h Range):  Temp:  [98.6 °F (37 °C)] 98.6 °F (37 °C)  Pulse:  [75-89] 89  Resp:  [16-20] 20  SpO2:  [99 %-100 %] 100 %  BP: (154-182)/(69-93) 154/89     Weight: 79.4 kg (175 lb)  Body mass index is 25.84 kg/m².    SpO2: 100 %  O2 Device (Oxygen Therapy): room air    No intake or output data in the 24 hours ending 04/23/18 1615    Lines/Drains/Airways     Peripheral Intravenous Line                 Peripheral IV - Single Lumen 04/23/18 1145 Left Antecubital less than 1 day                Physical Exam   Constitutional: He is oriented to person, place, and time. He appears well-developed and well-nourished. He is not intubated.   HENT:   Head: Normocephalic and atraumatic.   Right Ear: External ear normal.   Left Ear: External ear normal.   Mouth/Throat: Oropharynx is clear and moist.   Eyes: Conjunctivae and EOM are normal. Pupils are equal, round, and reactive to light. Right eye exhibits no discharge. Left eye exhibits no discharge. No scleral icterus.   Neck: Normal range of motion. Neck supple. Normal carotid pulses, no hepatojugular reflux and no JVD present. Carotid bruit is not present. No tracheal deviation present. No thyromegaly present.   Cardiovascular: Normal rate, regular rhythm, S1 normal and S2 normal.   No extrasystoles are present. PMI is not displaced.  Exam reveals no gallop, no S3, no distant heart sounds, no friction rub and no midsystolic click.    No murmur heard.  Pulses:       Carotid pulses are 2+ on the right side, and 2+ on the left side.       Radial pulses are 2+ on the right side, and 2+ on the left side.        Femoral pulses are 2+ on the right side, and 2+ on the left side.       Popliteal pulses are 2+ on the right side, and 2+ on the left side.        Dorsalis  pedis pulses are 2+ on the right side, and 2+ on the left side.        Posterior tibial pulses are 2+ on the right side, and 2+ on the left side.   Pulmonary/Chest: Effort normal and breath sounds normal. No accessory muscle usage or stridor. No apnea, no tachypnea and no bradypnea. He is not intubated. No respiratory distress. He has no decreased breath sounds. He has no wheezes. He has no rales. He exhibits no tenderness and no bony tenderness.   Abdominal: He exhibits no distension, no pulsatile liver, no abdominal bruit, no ascites, no pulsatile midline mass and no mass. There is no tenderness. There is no rebound and no guarding.   Musculoskeletal: Normal range of motion. He exhibits no edema or tenderness.   Lymphadenopathy:     He has no cervical adenopathy.   Neurological: He is alert and oriented to person, place, and time. He has normal reflexes. No cranial nerve deficit. Coordination normal.   Skin: Skin is warm. No rash noted. No erythema. No pallor.   Psychiatric: He has a normal mood and affect. His behavior is normal. Judgment and thought content normal.       Significant Labs:       LABS  CBC    Recent Labs  Lab 04/23/18  1149   WBC 10.03   RBC 4.78   HGB 13.1*   HCT 40.6      MCV 85   MCH 27.4   MCHC 32.3     BMP    Recent Labs  Lab 04/23/18  1149      K 4.0   CO2 25      BUN 13   CREATININE 0.9   GLU 91       POCT-Glucose  No results found for: POCTGLUCOSE      Recent Labs  Lab 04/23/18  1149   CALCIUM 9.5     LFT    Recent Labs  Lab 04/23/18  1149   PROT 7.8   ALBUMIN 3.6   BILITOT 0.4   AST 12   ALKPHOS 78   ALT 7*     GFR     COAGS  No results for input(s): PT, INR, APTT in the last 168 hours.  CE    Recent Labs  Lab 04/23/18  1149   TROPONINI 0.039*     ABGs  No results for input(s): PH, PCO2, PO2, HCO3, POCSATURATED, BE in the last 24 hours.  BNP    Recent Labs  Lab 04/23/18  1149   *       LAST HbA1c  No results found for: HGBA1C    Lipid panel  Lab Results    Component Value Date    CHOL 291 (H) 06/21/2015     Lab Results   Component Value Date    HDL 39 (L) 06/21/2015     Lab Results   Component Value Date    LDLCALC 223.8 (H) 06/21/2015     Lab Results   Component Value Date    TRIG 141 06/21/2015     Lab Results   Component Value Date    CHOLHDL 13.4 (L) 06/21/2015          Significant Imaging:       Imaging Results          X-Ray Chest AP Portable (Final result)  Result time 04/23/18 12:02:13    Final result by Bertram Hernández MD (04/23/18 12:02:13)                 Impression:      Possible right thyromegaly, consider elective thyroid ultrasound.  Chest otherwise unchanged, normal.      Electronically signed by: Bertram Hernández MD  Date:    04/23/2018  Time:    12:02             Narrative:    EXAMINATION:  XR CHEST AP PORTABLE    CLINICAL HISTORY:  Chest Pain;    TECHNIQUE:  Single frontal view of the chest was performed.    COMPARISON:  Two thousand fifteen    FINDINGS:  Heart normal.  Lungs clear.  Bony thorax stable, normal.  Displacement trachea right, at suprasternal notch, right thyromegaly question.                                  Assessment and Plan:     NSTEMI (non-ST elevated myocardial infarction)        Returning for NSTEMI      C in am  R radial   AMANUEL candidate      Risks, benefits and alternatives of cardiac catheterization and possible intervention were discussed with the patient. All questions were answered and informed consent obtained.     I discussed the importance of compliance with dual antiplatelet therapy with the patient to prevent acute or late stent thrombosis with premature discontinuation of the therapy.      Patient is a candidate for drug eluting stents.     Verbally the patient has expressed full understanding of the clinical importance of dual antiplatelet therapy compliance. Drug eluting stents require a minimum of 12 months of dual anti-platelet therapy.              VTE Risk Mitigation         Ordered     enoxaparin  injection 80 mg  Once      04/23/18 1611     IP VTE LOW RISK PATIENT  Once      04/23/18 1303          Smoking cessation  Resume statin therapy  Cardiac rehab II        Echo         Further recommendations to follow           Thank you for your consult.    Chaparro Collazo MD  Cardiology   [facility]

## 2018-04-24 NOTE — PLAN OF CARE
"Patient AAOx3  Independent with ADL's  Lives at home with wife.  Hasn't taken meds in a few months- states "the meds just hadn't made me feel good- but I know now I should have taken them." Explained importance of complaince of plavix, hypertensive medications. Patient verbalizes understanding.    Patient stated his PCP is at [facility] and retiring. Explained [facility] and patient agreeable.       04/24/18 1106   Discharge Assessment   Assessment Type Discharge Planning Assessment   Confirmed/corrected address and phone number on facesheet? Yes   Assessment information obtained from? Patient   Prior to hospitilization cognitive status: Alert/Oriented   Prior to hospitalization functional status: Independent   Current cognitive status: Alert/Oriented   Current Functional Status: Independent   Lives With spouse   Able to Return to Prior Arrangements no   Is patient able to care for self after discharge? Yes   Patient's perception of discharge disposition home or selfcare   Readmission Within The Last 30 Days no previous admission in last 30 days   Patient currently being followed by outpatient case management? No   Patient currently receives any other outside agency services? No   Equipment Currently Used at Home none   Do you have any problems affording any of your prescribed medications? No   Is the patient taking medications as prescribed? yes   Does the patient have transportation home? Yes   Discharge Plan A Home   Discharge Plan B Home   Patient/Family In Agreement With Plan yes     Alina Pike RN, CCM, CMSRN  RN Transition Navigator  [phone]        "

## 2018-04-24 NOTE — NURSING
Patient to cath lab via stretcher with cath lab RN staff. Tele moniitor remains intact. At present no distress noted. Family at bedside.

## 2018-04-24 NOTE — PROGRESS NOTES
.Pharmacy New Medication Education    Patient accepted medication education.    Pharmacy educated patient on name and purpose of medications and possible side effects, using the teach-back method.     Current Inpatient Medication Orders   0.9% NaCl infusion   * aspirin EC tablet 81 mg   atorvastatin tablet 80 mg   * clopidogrel tablet 75 mg   diphenhydrAMINE capsule 50 mg   lisinopril tablet 10 mg   metoprolol tartrate (LOPRESSOR) tablet 50 mg      sodium chloride 0.9% flush 3 mL       Learners of pharmacy medication education included:  Patient    Patient +/- learner response:  Verbalized Understanding, Teachback

## 2018-04-25 ENCOUNTER — TELEPHONE (OUTPATIENT)
Dept: CARDIOLOGY | Facility: CLINIC | Age: 63
End: 2018-04-25

## 2018-04-25 VITALS
WEIGHT: 175 LBS | HEART RATE: 86 BPM | TEMPERATURE: 100 F | OXYGEN SATURATION: 99 % | DIASTOLIC BLOOD PRESSURE: 80 MMHG | BODY MASS INDEX: 25.92 KG/M2 | SYSTOLIC BLOOD PRESSURE: 156 MMHG | RESPIRATION RATE: 16 BRPM | HEIGHT: 69 IN

## 2018-04-25 LAB
BASOPHILS # BLD AUTO: ABNORMAL K/UL
BASOPHILS NFR BLD: 0 %
DIFFERENTIAL METHOD: ABNORMAL
EOSINOPHIL # BLD AUTO: ABNORMAL K/UL
EOSINOPHIL NFR BLD: 4 %
ERYTHROCYTE [DISTWIDTH] IN BLOOD BY AUTOMATED COUNT: 13.5 %
HCT VFR BLD AUTO: 39.6 %
HGB BLD-MCNC: 13 G/DL
LYMPHOCYTES # BLD AUTO: ABNORMAL K/UL
LYMPHOCYTES NFR BLD: 34 %
MCH RBC QN AUTO: 27.7 PG
MCHC RBC AUTO-ENTMCNC: 32.8 G/DL
MCV RBC AUTO: 84 FL
MONOCYTES # BLD AUTO: ABNORMAL K/UL
MONOCYTES NFR BLD: 8 %
NEUTROPHILS NFR BLD: 54 %
PLATELET # BLD AUTO: 303 K/UL
PLATELET BLD QL SMEAR: ABNORMAL
PMV BLD AUTO: 9.2 FL
RBC # BLD AUTO: 4.7 M/UL
WBC # BLD AUTO: 11.52 K/UL

## 2018-04-25 PROCEDURE — 93010 ELECTROCARDIOGRAM REPORT: CPT | Mod: ,,, | Performed by: INTERNAL MEDICINE

## 2018-04-25 PROCEDURE — 25000003 PHARM REV CODE 250: Performed by: INTERNAL MEDICINE

## 2018-04-25 PROCEDURE — 25000003 PHARM REV CODE 250: Performed by: EMERGENCY MEDICINE

## 2018-04-25 PROCEDURE — A4216 STERILE WATER/SALINE, 10 ML: HCPCS | Performed by: EMERGENCY MEDICINE

## 2018-04-25 PROCEDURE — 25000003 PHARM REV CODE 250: Performed by: NURSE PRACTITIONER

## 2018-04-25 PROCEDURE — 99238 HOSP IP/OBS DSCHRG MGMT 30/<: CPT | Mod: ,,, | Performed by: INTERNAL MEDICINE

## 2018-04-25 PROCEDURE — 85027 COMPLETE CBC AUTOMATED: CPT

## 2018-04-25 PROCEDURE — 85007 BL SMEAR W/DIFF WBC COUNT: CPT

## 2018-04-25 PROCEDURE — 36415 COLL VENOUS BLD VENIPUNCTURE: CPT

## 2018-04-25 PROCEDURE — 94761 N-INVAS EAR/PLS OXIMETRY MLT: CPT

## 2018-04-25 PROCEDURE — 93005 ELECTROCARDIOGRAM TRACING: CPT

## 2018-04-25 RX ORDER — ATORVASTATIN CALCIUM 80 MG/1
80 TABLET, FILM COATED ORAL NIGHTLY
Qty: 90 TABLET | Refills: 3 | Status: SHIPPED | OUTPATIENT
Start: 2018-04-25 | End: 2018-04-25 | Stop reason: HOSPADM

## 2018-04-25 RX ORDER — NITROGLYCERIN 0.4 MG/1
0.4 TABLET SUBLINGUAL EVERY 5 MIN PRN
Qty: 25 TABLET | Refills: 11 | Status: SHIPPED | OUTPATIENT
Start: 2018-04-25 | End: 2019-04-25

## 2018-04-25 RX ORDER — METOPROLOL TARTRATE 50 MG/1
50 TABLET ORAL 2 TIMES DAILY
Qty: 60 TABLET | Refills: 11 | Status: SHIPPED | OUTPATIENT
Start: 2018-04-25 | End: 2018-09-13 | Stop reason: SDUPTHER

## 2018-04-25 RX ORDER — FENOFIBRATE 160 MG/1
160 TABLET ORAL DAILY
Qty: 30 TABLET | Refills: 11 | Status: SHIPPED | OUTPATIENT
Start: 2018-04-25 | End: 2019-04-25

## 2018-04-25 RX ORDER — CLOPIDOGREL BISULFATE 75 MG/1
75 TABLET ORAL DAILY
Qty: 30 TABLET | Refills: 11 | Status: SHIPPED | OUTPATIENT
Start: 2018-04-26 | End: 2019-04-26

## 2018-04-25 RX ORDER — ROSUVASTATIN CALCIUM 10 MG/1
10 TABLET, COATED ORAL DAILY
Qty: 30 TABLET | Refills: 11 | Status: SHIPPED | OUTPATIENT
Start: 2018-04-25 | End: 2018-08-06 | Stop reason: SDUPTHER

## 2018-04-25 RX ORDER — BENAZEPRIL HYDROCHLORIDE 10 MG/1
10 TABLET ORAL DAILY
Qty: 30 TABLET | Refills: 11 | Status: SHIPPED | OUTPATIENT
Start: 2018-04-25 | End: 2018-05-03 | Stop reason: ALTCHOICE

## 2018-04-25 RX ADMIN — ASPIRIN 81 MG: 81 TABLET, COATED ORAL at 08:04

## 2018-04-25 RX ADMIN — SODIUM CHLORIDE, PRESERVATIVE FREE 3 ML: 5 INJECTION INTRAVENOUS at 01:04

## 2018-04-25 RX ADMIN — SODIUM CHLORIDE, PRESERVATIVE FREE 3 ML: 5 INJECTION INTRAVENOUS at 06:04

## 2018-04-25 RX ADMIN — CLOPIDOGREL BISULFATE 75 MG: 75 TABLET ORAL at 08:04

## 2018-04-25 RX ADMIN — LISINOPRIL 20 MG: 20 TABLET ORAL at 08:04

## 2018-04-25 RX ADMIN — METOPROLOL TARTRATE 50 MG: 50 TABLET ORAL at 08:04

## 2018-04-25 NOTE — PLAN OF CARE
Patient received from cath lab. R radial band intact. No redness or hematoma noted. Fluids infusing via left ac. No distress no complaints at this time. Will continue to monitor.

## 2018-04-25 NOTE — PLAN OF CARE
Vasc band to r radial removed. No redness bleeding or hematoma noted. Dressing applied. NAD noted.

## 2018-04-25 NOTE — PROGRESS NOTES
Seen today      No cardiac issues overnight      Vitals:    04/24/18 1800 04/24/18 1830 04/24/18 1900 04/24/18 2110   BP: (!) 156/105 (!) 161/73 (!) 172/83 (!) 163/83   Pulse: 79 74 82 77   Resp: 18 15 15 18   Temp:       TempSrc:       SpO2: 98% 98% 97%    Weight:       Height:               LABS  CBC    Recent Labs  Lab 04/23/18  1149 04/24/18  0442   WBC 10.03 9.52   RBC 4.78 4.79   HGB 13.1* 13.4*   HCT 40.6 40.2    277   MCV 85 84   MCH 27.4 28.0   MCHC 32.3 33.3     BMP    Recent Labs  Lab 04/23/18  1149 04/24/18  0442    137   K 4.0 4.0   CO2 25 23    105   BUN 13 12   CREATININE 0.9 0.7   GLU 91 87       POCT-Glucose  No results found for: POCTGLUCOSE      Recent Labs  Lab 04/23/18  1149 04/24/18  0442   CALCIUM 9.5 9.4     LFT    Recent Labs  Lab 04/23/18  1149   PROT 7.8   ALBUMIN 3.6   BILITOT 0.4   AST 12   ALKPHOS 78   ALT 7*     GFR     COAGS  No results for input(s): PT, INR, APTT in the last 168 hours.  CE    Recent Labs  Lab 04/23/18  1149 04/23/18  1754 04/23/18  2339   TROPONINI 0.039* 0.867*  0.867* 1.667*     ABGs  No results for input(s): PH, PCO2, PO2, HCO3, POCSATURATED, BE in the last 24 hours.  BNP    Recent Labs  Lab 04/23/18  1149   *       LAST HbA1c  No results found for: HGBA1C    Lipid panel  Lab Results   Component Value Date    CHOL 339 (H) 04/24/2018    CHOL 339 (H) 04/24/2018    CHOL 291 (H) 06/21/2015     Lab Results   Component Value Date    HDL 41 04/24/2018    HDL 41 04/24/2018    HDL 39 (L) 06/21/2015     Lab Results   Component Value Date    LDLCALC 278.8 (H) 04/24/2018    LDLCALC 278.8 (H) 04/24/2018    LDLCALC 223.8 (H) 06/21/2015     Lab Results   Component Value Date    TRIG 96 04/24/2018    TRIG 96 04/24/2018    TRIG 141 06/21/2015     Lab Results   Component Value Date    CHOLHDL 12.1 (L) 04/24/2018    CHOLHDL 12.1 (L) 04/24/2018    CHOLHDL 13.4 (L) 06/21/2015            Imp:        CAD NSTEMI  HTN  HLP  Tobacco use  Medical non  compliance          [facility] today        Patent LM, LAD  LCX -seen since 2012  RCA with 50% ISR, iFR 1.0   EF 40-45% with LVEDP 30          Plan          Medical therapy  Adjust and resume medications  Smoking cessation  DC in am          Full cath report to follow

## 2018-04-25 NOTE — PLAN OF CARE
Future Appointments  Date Time Provider Department Center   5/9/2018 10:00 AM Linda Mar MD [facility] IM [place]   5/29/2018 1:20 PM Chaparro Collazo MD [facility] CARD[facility] [place]     [facility] brochure given to patient.    Discharge rounds on patient. Discussed followup appointments, blue discharge folder, discharge nurse will go over home medications and reasons for medications and final discharge instructions. All patient/caregiver questions answered. Patient verbalized understanding.         04/25/18 1636   Final Note   Assessment Type Final Discharge Note   Discharge Disposition Home   Hospital Follow Up  Appt(s) scheduled? Yes   Discharge plans and expectations educations in teach back method with documentation complete? Yes   Right Care Referral Info   Post Acute Recommendation No Care     Alina Pike, RN, CCM, CMSRN  RN Transition Navigator  [phone]

## 2018-04-25 NOTE — NURSING TRANSFER
Patient transferred to floor on tele monitor. VSS no c/o of pain.  Patient attached to tele monitor upon arrival in room.   Right radial site reviewed with RN.  Vasc band in place. CDI, no hematoma no bleeding.  Sanna RN at bedside.  All questions answered.  Wife at bedside.

## 2018-04-25 NOTE — PLAN OF CARE
Problem: Patient Care Overview  Goal: Plan of Care Review  Outcome: Ongoing (interventions implemented as appropriate)  Plan of care reviewed with patient. Wife at bedside. Call light within reach, fall precautions maintained, bed alarm set. Patient aware. Nurse instructed patient to call if needs assistance. Patient verbalized complete understanding. R Radial dressing CDI. Telemetry monitor SR throughout shift. NAD noted. Will continue to monitor and continue plan of care.

## 2018-04-25 NOTE — NURSING
Discharge instructions and education reviewed with pt and wife. Reviewed follow up appts, new medications, diet, and importance of medication compliance. Nurse provided pt with CHF education handouts and reviewed education with successful teach back. Pt and wife very receptive to education.  Allowed time for questions. Patient verbalized complete understanding.      PIV discontinued. Catheter tip intact. Secured with gauze and coban. Patient tolerated well. Telemetry monitor discontinued. No acute distress noted.

## 2018-04-25 NOTE — PLAN OF CARE
Problem: Patient Care Overview  Goal: Plan of Care Review  Outcome: Revised  Plan of care discussed with patient and/or family present. Patient and/or family verbalized complete understanding.   R radial site CDI no hematoma noted. NSR on telemetry  Patient is refusing bed alarm. Will continue to monitor.     Problem: Fall Risk (Adult)  Intervention: Monitor/Assist with Self Care  Fall precautions maintained. Bed in lowest position, locked, call light within reach, and bed alarm refused.  Side rails up x's 2 with slip resistant socks on. Nurse instructed patient to notify staff for any assistance and pt verbalized complete understanding.

## 2018-04-25 NOTE — TELEPHONE ENCOUNTER
----- Message from Kike Sanz sent at 4/25/2018  3:02 PM CDT -----  Contact: [facility] called  This pt is about to be discharged from the hospital,  They wanted to give him a RX for Lipitor which the pt refuses to take because it makes him lose his memory. However pt is willing to take Crestor

## 2018-04-25 NOTE — PROGRESS NOTES
.Pharmacy New Medication Education    Patient accepted medication education.    Pharmacy educated patient on name and purpose of medications and possible side effects, using the teach-back method.     Current Inpatient Medication Orders   0.9% NaCl infusion   acetaminophen tablet 650 mg   aspirin EC tablet 81 mg   atorvastatin tablet 80 mg   clopidogrel tablet 75 mg   diphenhydrAMINE capsule 50 mg   hydrALAZINE injection 10 mg   hydrocodone-acetaminophen 5-325mg per tablet 1 tablet   lisinopril tablet 20 mg   metoprolol tartrate (LOPRESSOR) tablet 50 mg   nitroGLYCERIN SL tablet 0.4 mg   ondansetron disintegrating tablet 8 mg   sodium chloride 0.9% flush 3 mL       Learners of pharmacy medication education included:  Patient    Patient +/- learner response:  Verbalized Understanding, Teachback

## 2018-04-26 ENCOUNTER — PATIENT MESSAGE (OUTPATIENT)
Dept: CARDIOLOGY | Facility: CLINIC | Age: 63
End: 2018-04-26

## 2018-04-26 NOTE — DISCHARGE SUMMARY
[facility]  Cardiology  Discharge Summary      Patient Name: Walter Gold  MRN: [id number]  Admission Date: 4/23/2018  Hospital Length of Stay: 2 days  Discharge Date and Time: 4/25/2018  5:35 PM  Attending Physician: Comfort att. providers found  Discharging Provider: SANJAY Cook, ANP  Primary Care Physician: Joselin Clark MD    HPI: 62 year old male with h/o HTN, HLP, CAD s/p RCA PCI twice, LCx , tobacco, and non comp compliance with medications presenting with chest pain with abnormal TNI. Last AMI and PCI 2015 4.0 x 26 mm Resolute AMANUEL in RCA.         Pain free in the ED after aspirin, plavix load, NTG paste, and lovenox    Procedure(s) (LRB):  Left heart cath (Left)     Indwelling Lines/Drains at time of discharge: none     Hospital Course    4/23/2018 Presented to the ER with complaints of chest pain. Pian relieved with SL NTG and ASA. EKG with NSR normal axis and TWI to inferior leads. History of CAD with self discontinuation of medication regimen, no follow up and continued tobacco abuse. Admitted to telemetry under care of [facility] for further evaluation of NSTEMI.  4/24/2018 Echocardiogram with severely depressed left ventricular systolic function with EF 20-25%,iImpaired LV relaxation, increased LVEDP.  Placed on GDMT and plan for LHC today for further evaluation  4/25/2018 Underwent LHC yesterday via right radial access with following results:     Patent LM, LAD  LCX -seen since 2012  RCA with 50% ISR, iFR 1.0   EF 40-45% with LVEDP 30    Tolerated procedure well with stable right radial access site. No further complaints of chest pain or SOB. SBP 140s-170s overnight with continuation of ACEI and BB. No up titration due to complaints of dizziness likely prompting self discontinuation. Ambulated in the hallway with no complaints of SOB. Discrepancy between LV gram and echocardiogram therefore underwent MUGA scan with EF is slightly reduced at 47%. Deemed ready for  discharge and sent home on ASA, Plavix, Metoprolol, Benazepril and Crestor. Discussed importance of strict adherence to medication regimen, follow up and complete smoking cessation. Will follow up in clinic in 2-3 weeks      Consults: none     Significant Diagnostic Studies: Cardiac Graphics: Echocardiogram:   2D echo with color flow doppler:   Results for orders placed or performed during the hospital encounter of 04/23/18   2D echo with color flow doppler   Result Value Ref Range    EF 20 (A) 55 - 65    Diastolic Dysfunction Yes (A)     Est. PA Systolic Pressure 25.81     Mitral Valve Mobility NORMAL     Tricuspid Valve Regurgitation TRIVIAL        Pending Diagnostic Studies:     None          Final Active Diagnoses:    Diagnosis Date Noted POA    PRINCIPAL PROBLEM:  NSTEMI (non-ST elevated myocardial infarction) [I21.4] 06/20/2015 Yes    Elevated troponin [R74.8] 04/23/2018 Yes    Chest pain [R07.9] 06/20/2015 Yes    CAD (coronary artery disease) [I25.10] 06/20/2015 Yes    Tobacco abuse [Z72.0] 06/20/2015 Yes    Hyperlipidemia LDL goal <70 [E78.5] 06/20/2015 Yes    Hypertension [I10] 06/20/2015 Yes      Problems Resolved During this Admission:    Diagnosis Date Noted Date Resolved POA       Discharged Condition: good    Follow Up:  Follow-up Information     Chaparro Collazo MD On 5/29/2018.    Specialties:  Cardiology, INTERVENTIONAL CARDIOLOGY  Why:  1:20pm  Contact information:  [address]  [address]  [place] [facility] [id number] [phone]             Linda Mar MD On 5/9/2018.    Specialty:  Hospitalist  Why:  10am  Contact information:  [address]  [address]  [facility] [address] [phone]                 Patient Instructions:     Diet Cardiac     Activity as tolerated       Medications:  Reconciled Home Medications:      Medication List      START taking these medications    clopidogrel 75 mg tablet  Commonly known as:  PLAVIX  Take 1 tablet (75 mg total) by mouth once daily.  Start  taking on:  4/26/2018     metoprolol tartrate 50 MG tablet  Commonly known as:  LOPRESSOR  Take 1 tablet (50 mg total) by mouth 2 (two) times daily.     rosuvastatin 10 MG tablet  Commonly known as:  CRESTOR  Take 1 tablet (10 mg total) by mouth once daily.        CHANGE how you take these medications    benazepril 10 MG tablet  Commonly known as:  LOTENSIN  Take 1 tablet (10 mg total) by mouth once daily.  What changed:  · medication strength  · how much to take        CONTINUE taking these medications    aspirin 81 MG Chew  Take 81 mg by mouth once daily.     ONE DAILY MULTIVITAMIN per tablet  Generic drug:  multivitamin  Take 1 tablet by mouth once daily.        STOP taking these medications    ticagrelor 90 mg tablet  Commonly known as:  BRILINTA            Time spent on the discharge of patient: 20 minutes    SANJAY Cook, RICARDO  Cardiology  [facility]

## 2018-04-26 NOTE — TELEPHONE ENCOUNTER
----- Message from SANJAY Perez ANP sent at 4/25/2018  8:38 PM CDT -----  Contact: Raven Rey  Patient was discharged yesterday. I placed him on Metoprolol Tartrate 50mg po BID and inadverently wrote for him to resume his Carvedilol. He should have received Metoprolol from our pharmacy. Can one of you all please call him and tell him NO Carvedilol and to continue Metoprolol BID only? And can you all please remove it off of his list?    Thanks  Raven

## 2018-04-27 LAB — CORONARY STENOSIS: ABNORMAL

## 2018-05-02 ENCOUNTER — TELEPHONE (OUTPATIENT)
Dept: CARDIOLOGY | Facility: CLINIC | Age: 63
End: 2018-05-02

## 2018-05-02 NOTE — TELEPHONE ENCOUNTER
----- Message from Constanza Xiao sent at 5/2/2018  4:03 PM CDT -----  Contact: wife Sujatha [phone]  Patient's wife is requesting to speak with you concerning benazepril (LOTENSIN) 10 MG tablet. Patient is allergic and is requesting an alternative be called into [facility]. Please call and advise.

## 2018-05-02 NOTE — TELEPHONE ENCOUNTER
----- Message from Constanza Xiao sent at 5/2/2018  4:03 PM CDT -----  Contact: wife Sujatha [phone]  Patient's wife is requesting to speak with you concerning benazepril (LOTENSIN) 10 MG tablet. Patient is allergic and is requesting an alternative be called into [facility]. Please call and advise.

## 2018-05-03 RX ORDER — VALSARTAN 80 MG/1
80 TABLET ORAL DAILY
Qty: 90 TABLET | Refills: 3 | Status: SHIPPED | OUTPATIENT
Start: 2018-05-03 | End: 2018-08-06

## 2018-05-03 NOTE — TELEPHONE ENCOUNTER
Returned patients call.    Mrs. Gold states patient has tried taking benazepril in the past and he developed a dry hacking cough.    Could you prescribe something else in its place.    Thanks

## 2018-05-08 ENCOUNTER — TELEPHONE (OUTPATIENT)
Dept: CARDIOLOGY | Facility: CLINIC | Age: 63
End: 2018-05-08

## 2018-05-08 NOTE — TELEPHONE ENCOUNTER
Returned Mrs. Gold's phone call.    She would like a different medication called in for b/p other than Diovan, she states patient took one before that he did well on but cannot recall the name.    They will discuss it with PCP tomorrow.

## 2018-05-08 NOTE — TELEPHONE ENCOUNTER
----- Message from Sharyn Sanchez sent at 5/7/2018  4:05 PM CDT -----  Contact: [phone]/wife  Pt wife is requesting to speak with you concerning the pt prescriptions  Please call and advise

## 2018-05-09 ENCOUNTER — TELEPHONE (OUTPATIENT)
Dept: PRIMARY CARE CLINIC | Facility: CLINIC | Age: 63
End: 2018-05-09

## 2018-05-09 ENCOUNTER — LAB VISIT (OUTPATIENT)
Dept: LAB | Facility: HOSPITAL | Age: 63
End: 2018-05-09
Attending: INTERNAL MEDICINE
Payer: MEDICARE

## 2018-05-09 ENCOUNTER — OFFICE VISIT (OUTPATIENT)
Dept: PRIMARY CARE CLINIC | Facility: CLINIC | Age: 63
End: 2018-05-09
Payer: MEDICARE

## 2018-05-09 VITALS
TEMPERATURE: 97 F | WEIGHT: 173.06 LBS | HEART RATE: 59 BPM | SYSTOLIC BLOOD PRESSURE: 152 MMHG | DIASTOLIC BLOOD PRESSURE: 84 MMHG | BODY MASS INDEX: 25.56 KG/M2 | OXYGEN SATURATION: 99 %

## 2018-05-09 DIAGNOSIS — R73.03 BORDERLINE DIABETES MELLITUS: ICD-10-CM

## 2018-05-09 DIAGNOSIS — G47.30 SLEEP APNEA, UNSPECIFIED TYPE: ICD-10-CM

## 2018-05-09 DIAGNOSIS — I21.4 NSTEMI (NON-ST ELEVATED MYOCARDIAL INFARCTION): Primary | ICD-10-CM

## 2018-05-09 DIAGNOSIS — Z72.0 TOBACCO ABUSE: ICD-10-CM

## 2018-05-09 DIAGNOSIS — I10 ESSENTIAL HYPERTENSION: ICD-10-CM

## 2018-05-09 PROBLEM — R79.89 ELEVATED TROPONIN: Status: RESOLVED | Noted: 2018-04-23 | Resolved: 2018-05-09

## 2018-05-09 LAB
ESTIMATED AVG GLUCOSE: 111 MG/DL
HBA1C MFR BLD HPLC: 5.5 %
TSH SERPL DL<=0.005 MIU/L-ACNC: 2.27 UIU/ML

## 2018-05-09 PROCEDURE — 83036 HEMOGLOBIN GLYCOSYLATED A1C: CPT

## 2018-05-09 PROCEDURE — 99213 OFFICE O/P EST LOW 20 MIN: CPT | Mod: PBBFAC,PO | Performed by: INTERNAL MEDICINE

## 2018-05-09 PROCEDURE — 36415 COLL VENOUS BLD VENIPUNCTURE: CPT

## 2018-05-09 PROCEDURE — 99496 TRANSJ CARE MGMT HIGH F2F 7D: CPT | Mod: PBBFAC,PO | Performed by: INTERNAL MEDICINE

## 2018-05-09 PROCEDURE — 99496 TRANSJ CARE MGMT HIGH F2F 7D: CPT | Mod: S$PBB,,, | Performed by: INTERNAL MEDICINE

## 2018-05-09 PROCEDURE — 84443 ASSAY THYROID STIM HORMONE: CPT

## 2018-05-09 PROCEDURE — 99999 PR PBB SHADOW E&M-EST. PATIENT-LVL III: CPT | Mod: PBBFAC,,, | Performed by: INTERNAL MEDICINE

## 2018-05-09 NOTE — TELEPHONE ENCOUNTER
----- Message from Linda Mar MD sent at 5/9/2018  2:18 PM CDT -----  Please let Mr Gold know his blood work looks good. Thyroid is within normal limits, and he is NOT diabetic. Thanks.

## 2018-05-09 NOTE — PROGRESS NOTES
"[facility]  New Visit Progress Note   Recent Hospital Discharge     PRESENTING HISTORY     Chief Complaint/Reason for Admission:  Follow up Hospital Discharge       PCP: Joselin Clark MD    History of Present Illness:  Mr. Walter Gold is a 62 y.o. male who was recently admitted to the hospital.    Patient Name: Walter Gold  MRN: [id number]  Admission Date: 4/23/2018  Hospital Length of Stay: 2 days  Discharge Date and Time: 4/25/2018  5:35 PM  Attending Physician: No att. providers found  Discharging Provider: SANJAY Cook, ANP  Primary Care Physician: Joselin Clark MD    ___________________________________________________________________    Today:  [facility].  Recently hospitalized with NSTEMI.  Medication non compliance, discontinuation of medical follow up, and continued tobacco abuse were significant contributing factors.  Underwent LHC via right radial access.  Found to have patent LM and patent LAD.   LCX with chronic total occlusion- known since 2012.  AMANUEL to RCA from 2015.  No further intervention performed.  Discrepancy noted between LV gram and 2 D echocardiogram; subsequent MUGA scan showed EF slightly reduced at 47%.   Medical management maximized and patient discharged to home.    Accompanied today by his wife.  Ambulatory and independent with ADL's.  Has quit smoking since hospital discharge- having difficulty with this.  Unable to tolerate benazepril secondary to cough.  Has not yet picked up new Rx for Valsartan.  Believes he may have had a intolerable side effect (not allergic reaction) to ARB in the past. He is willing to trial it again.    Wife reports patient snores very loudly, has periods of apnea while sleeping, wakes up gasping.  Patient reports episodes of sleep paralysis.   Will order sleep study.      Had diagnosis of "borderline diabetes" several years ago- was prescribed an oral diabetic agent but did not take it.  Will re-check " HgBA1C.      Review of Systems  General ROS: negative for chills, fever or weight loss  Psychological ROS: negative for hallucination, depression or suicidal ideation  Ophthalmic ROS: negative for blurry vision, photophobia or eye pain  ENT ROS: negative for epistaxis, sore throat or rhinorrhea  Respiratory ROS: no cough, shortness of breath, or wheezing  Cardiovascular ROS: no chest pain or dyspnea on exertion  Gastrointestinal ROS: no abdominal pain, change in bowel habits, or black/ bloody stools  Genito-Urinary ROS: no dysuria, trouble voiding, or hematuria  Musculoskeletal ROS: negative for gait disturbance or muscular weakness  Neurological ROS: no syncope or seizures; no ataxia  Dermatological ROS: negative for pruritis, rash and jaundice      PAST HISTORY:     Past Medical History:   Diagnosis Date    Coronary artery disease     Hyperlipidemia     Hypertension        No past surgical history on file.    Family History   Problem Relation Age of Onset    Hyperlipidemia Mother     Heart disease Brother        Social History     Social History    Marital status:      Spouse name: N/A    Number of children: N/A    Years of education: N/A     Social History Main Topics    Smoking status: Former Smoker     Packs/day: 0.50     Types: Cigarettes     Start date: 6/25/2014    Smokeless tobacco: Not on file    Alcohol use Yes    Drug use: No    Sexual activity: Not on file     Other Topics Concern    Not on file     Social History Narrative    No narrative on file       MEDICATIONS & ALLERGIES:     Current Outpatient Prescriptions on File Prior to Visit   Medication Sig Dispense Refill    aspirin 81 MG Chew Take 81 mg by mouth once daily.      clopidogrel (PLAVIX) 75 mg tablet Take 1 tablet (75 mg total) by mouth once daily. 30 tablet 11    fenofibrate 160 MG Tab Take 1 tablet (160 mg total) by mouth once daily. 30 tablet 11    metoprolol tartrate (LOPRESSOR) 50 MG tablet Take 1 tablet (50 mg  total) by mouth 2 (two) times daily. 60 tablet 11    multivitamin (ONE DAILY MULTIVITAMIN) per tablet Take 1 tablet by mouth once daily.      nitroGLYCERIN (NITROSTAT) 0.4 MG SL tablet Place 1 tablet (0.4 mg total) under the tongue every 5 (five) minutes as needed for Chest pain. 25 tablet 11    rosuvastatin (CRESTOR) 10 MG tablet Take 1 tablet (10 mg total) by mouth once daily. 30 tablet 11    valsartan (DIOVAN) 80 MG tablet Take 1 tablet (80 mg total) by mouth once daily. 90 tablet 3     No current facility-administered medications on file prior to visit.         Review of patient's allergies indicates:   Allergen Reactions    Benazepril      coughing    Lipitor [atorvastatin]      Memory loss    Losartan      Causes coughing, not certain if an allergy       OBJECTIVE:     Vital Signs:  Vitals:    05/09/18 1008   BP: (!) 152/84   Pulse: (!) 59   Temp: 97.4 °F (36.3 °C)     Wt Readings from Last 1 Encounters:   04/23/18 1905 79.4 kg (175 lb)   04/23/18 1114 79.4 kg (175 lb)     Body mass index is 25.56 kg/m².        Physical Exam:  BP (!) 152/84 (BP Location: Left arm, Patient Position: Sitting, BP Method: Small (Automatic))   Pulse (!) 59   Temp 97.4 °F (36.3 °C) (Oral)   Wt 78.5 kg (173 lb 1 oz)   SpO2 99%   BMI 25.56 kg/m²   General appearance: alert, cooperative, no distress  Constitutional:Oriented to person, place, and time  + appears well-developed and well-nourished.   HEENT: Normocephalic, atraumatic, neck symmetrical, no nasal discharge   Eyes: conjunctivae/corneas clear, PERRL, EOM's intact  Lungs: clear to auscultation bilaterally, no dullness to percussion bilaterally  Heart: regular rate and rhythm without rub; no displacement of the PMI   Abdomen: soft, non-tender; bowel sounds normoactive; no organomegaly  Extremities: extremities symmetric; no clubbing, cyanosis, or edema  Integument: Skin color, texture, turgor normal; no rashes; hair distrubution normal  Neurologic: Alert and oriented  X 3, normal strength, normal coordination and gait  Psychiatric: no pressured speech; normal affect; no evidence of impaired cognition     Laboratory  Lab Results   Component Value Date    WBC 11.52 04/25/2018    HGB 13.0 (L) 04/25/2018    HCT 39.6 (L) 04/25/2018    MCV 84 04/25/2018     04/25/2018     BMP  Lab Results   Component Value Date     04/24/2018    K 4.0 04/24/2018     04/24/2018    CO2 23 04/24/2018    BUN 12 04/24/2018    CREATININE 0.7 04/24/2018    CALCIUM 9.4 04/24/2018    ANIONGAP 9 04/24/2018    ESTGFRAFRICA >60 04/24/2018    EGFRNONAA >60 04/24/2018     Lab Results   Component Value Date    ALT 7 (L) 04/23/2018    AST 12 04/23/2018    ALKPHOS 78 04/23/2018    BILITOT 0.4 04/23/2018     Lab Results   Component Value Date    INR 1.2 06/20/2015     No results found for: HGBA1C  No results for input(s): POCTGLUCOSE in the last 72 hours.      TRANSITION OF CARE:     [facility] Contact Note: 4/26/18    Family and/or Caretaker present at visit?  Yes.  Diagnostic tests reviewed/disposition: I have reviewed all completed as well as pending diagnostic tests at the time of discharge.  Disease/illness education: Yes  Home health/community services discussion/referrals: Patient does not have home health established from hospital visit.  They do not need home health.  If needed, we will set up home health for the patient.   Establishment or re-establishment of referral orders for community resources: No other necessary community resources.   Discussion with other health care providers: No discussion with other health care providers necessary.     ASSESSMENT & PLAN:     NSTEMI (non-ST elevated myocardial infarction)  - recent hospitalization for NSTEMI  - Had [facility] with findings as documented above, no additional intervention performed  - stable on Aspirin, Plavix, Bblocker, will start ARB tomorrow  - ACEI intolerance- cough  - has cardiology follow up 5/29/18 with Dr Collazo     Essential  "hypertension  - not at goal presently, but patient has not yet started newly prescribed Valsartan  - will monitor    Borderline diabetes mellitus  - hx of diagnosis of "borderline DM" and oral medication prescribed which patient did not take, will get HgBA1C today  -     TSH; Future; Expected date: 05/09/2018  -     Hemoglobin A1c; Future; Expected date: 05/09/2018    Tobacco abuse  - has quit smoking since hospital discharge, patient admits he is having difficulty maintaining abstinence from tobacco  -     Ambulatory referral to Smoking Cessation Program    Sleep apnea, unspecified type  -     Polysomnography 4 or more parameters; Future        [facility] will [facility].  Patient will need to establish care with new PCP upon discharge from [facility].     Instructions for the patient:      Scheduled Follow-up :  Future Appointments  Date Time Provider Department Center   5/29/2018 1:20 PM Chaparro Collazo MD [facility] C[facility] [place]   5/29/2018 3:30 PM Linda Mar MD [facility] [place]       Post Visit Medication List:     Medication List          Accurate as of 5/9/18 12:30 PM. If you have any questions, ask your nurse or doctor.               CONTINUE taking these medications    aspirin 81 MG Chew     clopidogrel 75 mg tablet  Commonly known as:  PLAVIX  Take 1 tablet (75 mg total) by mouth once daily.     fenofibrate 160 MG Tab  Take 1 tablet (160 mg total) by mouth once daily.     metoprolol tartrate 50 MG tablet  Commonly known as:  LOPRESSOR  Take 1 tablet (50 mg total) by mouth 2 (two) times daily.     nitroGLYCERIN 0.4 MG SL tablet  Commonly known as:  NITROSTAT  Place 1 tablet (0.4 mg total) under the tongue every 5 (five) minutes as needed for Chest pain.     ONE DAILY MULTIVITAMIN per tablet  Generic drug:  multivitamin     rosuvastatin 10 MG tablet  Commonly known as:  CRESTOR  Take 1 tablet (10 mg total) by mouth once daily.     valsartan 80 MG tablet  Commonly " known as:  DIOVAN  Take 1 tablet (80 mg total) by mouth once daily.            Signing Physician:  Linda Mar MD

## 2018-05-09 NOTE — TELEPHONE ENCOUNTER
Called patient and let him know that his labs look good, his thyroid is within normal limits and he is not diabetic. Patient verbalized understanding. Told patient that if he had any questions before his next appointment to please give me a call.

## 2018-05-15 ENCOUNTER — TELEPHONE (OUTPATIENT)
Dept: SLEEP MEDICINE | Facility: OTHER | Age: 63
End: 2018-05-15

## 2018-05-17 ENCOUNTER — CLINICAL SUPPORT (OUTPATIENT)
Dept: SMOKING CESSATION | Facility: CLINIC | Age: 63
End: 2018-05-17
Payer: COMMERCIAL

## 2018-05-17 DIAGNOSIS — F17.200 NICOTINE DEPENDENCE: Primary | ICD-10-CM

## 2018-05-17 PROCEDURE — 99404 PREV MED CNSL INDIV APPRX 60: CPT | Mod: S$GLB,,, | Performed by: GENERAL PRACTICE

## 2018-05-17 PROCEDURE — 99999 PR PBB SHADOW E&M-EST. PATIENT-LVL I: CPT | Mod: PBBFAC,,,

## 2018-05-17 RX ORDER — DM/P-EPHED/ACETAMINOPH/DOXYLAM 30-7.5/3
2 LIQUID (ML) ORAL
Qty: 108 LOZENGE | Refills: 0 | Status: SHIPPED | OUTPATIENT
Start: 2018-05-17

## 2018-05-17 NOTE — Clinical Note
Patient states that since a recent heart attack he has hardly smoked any cigarettes. Patient states that he make smoke 1-2 cigarettes, but not everyday. Patient to use 2 mg nicotine lozenge as needed in place of cigarettes.

## 2018-05-22 ENCOUNTER — TELEPHONE (OUTPATIENT)
Dept: SLEEP MEDICINE | Facility: OTHER | Age: 63
End: 2018-05-22

## 2018-05-23 ENCOUNTER — CLINICAL SUPPORT (OUTPATIENT)
Dept: SMOKING CESSATION | Facility: CLINIC | Age: 63
End: 2018-05-23
Payer: COMMERCIAL

## 2018-05-23 DIAGNOSIS — F17.200 NICOTINE DEPENDENCE: Primary | ICD-10-CM

## 2018-05-23 PROCEDURE — 99999 PR PBB SHADOW E&M-EST. PATIENT-LVL I: CPT | Mod: PBBFAC,,,

## 2018-05-23 PROCEDURE — 90853 GROUP PSYCHOTHERAPY: CPT | Mod: S$GLB,,, | Performed by: GENERAL PRACTICE

## 2018-05-23 NOTE — Clinical Note
Patient states that he is smoking 3-4 cpd. Patient currently using 2 mg nicotine lozenges as needed in place of cigarettes. Patient states that he has been using a strategy of making himself walk 2 miles if smoke a cigarette. CO= 15 ppm with having had last cigarette 2 hours ago.

## 2018-05-24 NOTE — PROGRESS NOTES
Smoking Cessation Group Session #2    Site: [facility]  Date:  5/23/2018  Clinical Status of Patient: Outpatient   Length of Service and Code: 60 minutes - 99030   Number in Attendance: 5  Group Activities/Focus of Group:  Completion of TCRS (Tobacco Cessation Rating Scale) reviewed strategies, cues, and triggers. Introduced the negative impact of tobacco on health, the health advantages of discontinuing the use of tobacco, time line improved health changes after a quit, withdrawal issues to expect from nicotine and habit, and ways to achieve the goal of a quit.  Specific session focus: Health and tobacco    Target symptoms:  withdrawal and medication side effects             The following were rated moderate (3) to severe (4) on TCRS:       Moderate 3: Desire or crave tobacco     Severe 4:   None  Patient's Response to Intervention: Patient states that he is smoking 3-4 cpd. Patient currently using 2 mg nicotine lozenges as needed in place of cigarettes. Patient states that he has been using a strategy of making himself walk 2 miles if smoke a cigarette. CO= 15 ppm with having had last cigarette 2 hours ago.     Progress Toward Goals and Other Mental Status Changes: Patient denies any behavioral or mental status changes at this time.       Diagnosis: Z72.0  Plan: The patient will continue with group therapy sessions and medication regimen prescribed with management by physician or by the [facility]ation [facility] Provider. Patient will inform Smoking Cessation Counselor of symptoms as rated high on TCRS.    Return to Clinic: 2 weeks    Quit Date: TBD  Planned Quit Date: TBD

## 2018-05-29 ENCOUNTER — OFFICE VISIT (OUTPATIENT)
Dept: CARDIOLOGY | Facility: CLINIC | Age: 63
End: 2018-05-29
Payer: MEDICARE

## 2018-05-29 ENCOUNTER — OFFICE VISIT (OUTPATIENT)
Dept: PRIMARY CARE CLINIC | Facility: CLINIC | Age: 63
End: 2018-05-29
Payer: MEDICARE

## 2018-05-29 VITALS
SYSTOLIC BLOOD PRESSURE: 138 MMHG | BODY MASS INDEX: 26.41 KG/M2 | WEIGHT: 173.75 LBS | TEMPERATURE: 98 F | DIASTOLIC BLOOD PRESSURE: 78 MMHG | OXYGEN SATURATION: 98 % | HEART RATE: 55 BPM

## 2018-05-29 VITALS
HEART RATE: 63 BPM | BODY MASS INDEX: 26.22 KG/M2 | HEIGHT: 68 IN | SYSTOLIC BLOOD PRESSURE: 150 MMHG | DIASTOLIC BLOOD PRESSURE: 80 MMHG | WEIGHT: 173 LBS

## 2018-05-29 DIAGNOSIS — I10 ESSENTIAL HYPERTENSION: ICD-10-CM

## 2018-05-29 DIAGNOSIS — I21.4 NSTEMI (NON-ST ELEVATED MYOCARDIAL INFARCTION): Primary | ICD-10-CM

## 2018-05-29 DIAGNOSIS — Z87.891 FORMER SMOKER: ICD-10-CM

## 2018-05-29 DIAGNOSIS — G47.30 SLEEP APNEA, UNSPECIFIED TYPE: ICD-10-CM

## 2018-05-29 DIAGNOSIS — E78.5 HYPERLIPIDEMIA LDL GOAL <70: ICD-10-CM

## 2018-05-29 DIAGNOSIS — I25.118 CORONARY ARTERY DISEASE INVOLVING NATIVE CORONARY ARTERY OF NATIVE HEART WITH OTHER FORM OF ANGINA PECTORIS: Primary | ICD-10-CM

## 2018-05-29 PROBLEM — R73.03 BORDERLINE DIABETES: Status: RESOLVED | Noted: 2018-05-09 | Resolved: 2018-05-29

## 2018-05-29 PROCEDURE — 99213 OFFICE O/P EST LOW 20 MIN: CPT | Mod: PBBFAC,27,PO | Performed by: INTERNAL MEDICINE

## 2018-05-29 PROCEDURE — 99213 OFFICE O/P EST LOW 20 MIN: CPT | Mod: S$PBB,,, | Performed by: INTERNAL MEDICINE

## 2018-05-29 PROCEDURE — 99999 PR PBB SHADOW E&M-EST. PATIENT-LVL III: CPT | Mod: PBBFAC,,, | Performed by: INTERNAL MEDICINE

## 2018-05-29 PROCEDURE — 99213 OFFICE O/P EST LOW 20 MIN: CPT | Mod: PBBFAC,PO | Performed by: INTERNAL MEDICINE

## 2018-05-29 PROCEDURE — 99215 OFFICE O/P EST HI 40 MIN: CPT | Mod: S$GLB,,, | Performed by: INTERNAL MEDICINE

## 2018-05-29 NOTE — PROGRESS NOTES
Subjective:   Patient ID:  Walter Gold is a 62 y.o. male who presents for follow up of Coronary Artery Disease; Hyperlipidemia; Hypertension; and tobacco use in the process of quitting      HPI:     Walter Gold 62 y.o. male is here follow up and feeling well without any new complaints. He was admitted 4/23/2018 with hypertensive urgency with NSTEMI with a peak TNI 1.667 and . He had stopped taking his medications because of intolerance. After his BP was controlled he had a LHC on 4/24/2018.         C 4/24/2018      Patent LM, LAD.     LCX -seen since 2012.     RCA with 50% ISR, iFR 1.0.     EF 40-45% with LVEDP 30 mmHg.      He has previous CAD history with RCA PCI 4.0 x 26 mm Resolute AMANUEL for AMI 6/20/2015 + RCA PCI with 4.5 x 24 mm Liberte BMS for AMI 7/27/2007 by Dr. Melchor.    [facility] 6/20/2015   LVEDP 25mmHg; LVEF 50%;    LM LIs;    LAD LIs;    LCX   80%;    RCA occl w/ISR   dRCA thrombectomy; PTCA and 4.0x26mm Resolute stent      He has HTN, HLP, CAD s/p RCA PCI with an untreated LCX -clinically stable.           He cut back on smoking significantly. He resumed taking his medications.  He does not check his BP at home. Today his BP is 150/80 mmHg. He thinks he has white coat syndrome with elevated BP      Patient Active Problem List    Diagnosis Date Noted    Sleep apnea 05/09/2018    NSTEMI (non-ST elevated myocardial infarction) 06/20/2015    CAD (coronary artery disease) 06/20/2015     4.0 x 26 mm Resolute AMANUEL for AMI 6/20/2015  4.5 x 24 mm Liberte BMS for AMI 7/27/2007 by Dr. Melchor    -STEMI  -[facility] (6/20/2015) LVEDP 25mmHg; LVEF 50%; LM LIs; LAD LIs; mcirc 80%; RCA occl w/ISR    dRCA thrombectomy; PTCA and 4.0x26mm Resolute stent      [facility] 4/24/2018:      Patent LM, LAD.    LCX -seen since 2012.    RCA with 50% ISR, iFR 1.0.    EF 40-45% with LVEDP 30 mmHg.      Former smoker 06/20/2015    Hyperlipidemia LDL goal <70 06/20/2015    Hypertension 06/20/2015           Right Arm BP -  Sittin/80  Left Arm BP - Sittin/80        LABS    LAST HbA1c  Lab Results   Component Value Date    HGBA1C 5.5 2018       Lipid panel  Lab Results   Component Value Date    CHOL 339 (H) 2018    CHOL 339 (H) 2018    CHOL 291 (H) 2015     Lab Results   Component Value Date    HDL 41 2018    HDL 41 2018    HDL 39 (L) 2015     Lab Results   Component Value Date    LDLCALC 278.8 (H) 2018    LDLCALC 278.8 (H) 2018    LDLCALC 223.8 (H) 2015     Lab Results   Component Value Date    TRIG 96 2018    TRIG 96 2018    TRIG 141 2015     Lab Results   Component Value Date    CHOLHDL 12.1 (L) 2018    CHOLHDL 12.1 (L) 2018    CHOLHDL 13.4 (L) 2015            Review of Systems   Constitution: Negative for diaphoresis, weakness, night sweats, weight gain and weight loss.   HENT: Negative for congestion.    Eyes: Negative for blurred vision, discharge and double vision.   Cardiovascular: Negative for chest pain, claudication, cyanosis, dyspnea on exertion, irregular heartbeat, leg swelling, near-syncope, orthopnea, palpitations, paroxysmal nocturnal dyspnea and syncope.   Respiratory: Negative for cough, shortness of breath and wheezing.    Endocrine: Negative for cold intolerance, heat intolerance and polyphagia.   Hematologic/Lymphatic: Negative for adenopathy and bleeding problem. Does not bruise/bleed easily.   Skin: Negative for dry skin and nail changes.   Musculoskeletal: Negative for arthritis, back pain, falls, joint pain, myalgias and neck pain.   Gastrointestinal: Negative for bloating, abdominal pain, change in bowel habit and constipation.   Genitourinary: Negative for bladder incontinence, dysuria, flank pain, genital sores and missed menses.   Neurological: Negative for aphonia, brief paralysis, difficulty with concentration and dizziness.   Psychiatric/Behavioral: Negative for altered mental status and memory  loss. The patient does not have insomnia.    Allergic/Immunologic: Negative for environmental allergies.       Objective:   Physical Exam   Constitutional: He is oriented to person, place, and time. He appears well-developed and well-nourished. He is not intubated.   HENT:   Head: Normocephalic and atraumatic.   Right Ear: External ear normal.   Left Ear: External ear normal.   Mouth/Throat: Oropharynx is clear and moist.   Eyes: Conjunctivae and EOM are normal. Pupils are equal, round, and reactive to light. Right eye exhibits no discharge. Left eye exhibits no discharge. No scleral icterus.   Neck: Normal range of motion. Neck supple. Normal carotid pulses, no hepatojugular reflux and no JVD present. Carotid bruit is not present. No tracheal deviation present. No thyromegaly present.   Cardiovascular: Normal rate, regular rhythm, S1 normal and S2 normal.   No extrasystoles are present. PMI is not displaced.  Exam reveals no gallop, no S3, no distant heart sounds, no friction rub and no midsystolic click.    No murmur heard.  Pulses:       Carotid pulses are 2+ on the right side, and 2+ on the left side.       Radial pulses are 2+ on the right side, and 2+ on the left side.        Femoral pulses are 2+ on the right side, and 2+ on the left side.       Popliteal pulses are 2+ on the right side, and 2+ on the left side.        Dorsalis pedis pulses are 2+ on the right side, and 2+ on the left side.        Posterior tibial pulses are 2+ on the right side, and 2+ on the left side.   Pulmonary/Chest: Effort normal and breath sounds normal. No accessory muscle usage or stridor. No apnea, no tachypnea and no bradypnea. He is not intubated. No respiratory distress. He has no decreased breath sounds. He has no wheezes. He has no rales. He exhibits no tenderness and no bony tenderness.   Abdominal: He exhibits no distension, no pulsatile liver, no abdominal bruit, no ascites, no pulsatile midline mass and no mass. There is  no tenderness. There is no rebound and no guarding.   Musculoskeletal: Normal range of motion. He exhibits no edema or tenderness.   Lymphadenopathy:     He has no cervical adenopathy.   Neurological: He is alert and oriented to person, place, and time. He has normal reflexes. No cranial nerve deficit. Coordination normal.   Skin: Skin is warm. No rash noted. No erythema. No pallor.   Psychiatric: He has a normal mood and affect. His behavior is normal. Judgment and thought content normal.       Assessment:     1. Coronary artery disease involving native coronary artery of native heart with other form of angina pectoris    2. Hyperlipidemia LDL goal <70    3. Essential hypertension    4. Former smoker        Plan:           Smoking cessation  Exercise  Low salt diet      Enroll in HTN clinic  If BP is not < 130/80 he will titrate diovan to 160 mg         Lipid panel to assess impact of crestor  Target LDL < 70  DAPT with aspirin + plavix  Cardia rehab II        Continue with current medical plan and lifestyle changes.  Return sooner for concerns or questions. If symptoms persist go to the ED  I have reviewed all pertinent data on this patient       I have reviewed the patient's medical history in detail and updated the computerized patient record.    Orders Placed This Encounter   Procedures    Lipid panel     fasting     Standing Status:   Future     Standing Expiration Date:   7/28/2019    Comprehensive metabolic panel     Standing Status:   Future     Standing Expiration Date:   7/28/2019    Hypertension Digital Medicine (HDMP) Enrollment Order     I. PURPOSE  To provide [facility] patients with innovative, specialized blood pressure monitoring and optimal dosing of antihypertensive therapy to improve health outcomes and decrease microvascular and macrovascular complications    II. GOALS  To maintain a systematic, coordinated and cost-effective process to monitor blood pressure in patients with  hypertension  To attain and maintain optimal antihypertensive therapy while ensuring patient safety using the most recent evidence-based guidelines for the management of hypertension as reported by AHA/ACC in 2017.   Provide consultative services to providers, patients and caregivers regarding optimal antihypertensive therapy  To improve patient/caregiver understanding and compliance related to antihypertensive therapies by providing continuous patient and caregiver education about their prescribed medications and associated disease state  To provide education, guidance and reinforcement regarding lifestyle modifications including weight loss, adopting and maintaining the Dietary Approaches to Stop Hypertension or DASH diet, sodium restriction, physical activity, and moderation of alcohol consumption to patients and caregivers  Allow providers increased availability of clinic time for direct patient care   To provide patient care and education within an interdisciplinary framework and enhance partnering with other members of health care team  Improve continuity of care for high blood pressure patients and improve patient engagement  Collect and utilize pharmacy related outcomes to improve quality of patient care    III. COLLABORATIVE PRACTICE AGREEMENT    A.  Under this collaborative practice agreement, an [facility] pharmacist according to and in compliance with [place] Board of Pharmacy Title 46, Part LIII, section 523 and [place] Board of Medical Examiners definition of the Collaborative Drug Therapy Management (CDTM) may initiate, implement, alter and monitor a therapeutic drug plan intended to manage antihypertensive therapy.  Services offered by the hypertension pharmacy specialist may include education on disease state and lifestyle modification, in addition to the drug therapy services listed above. Written and audio/visual educational materials and patient specific information may be provided to improve  quality of care.    B. Primary Collaborating Physician:    Primary Physician: Cleveland Castillo M.D., [facility], [place]  , Cardiology  License number: [id number]  CDTM number:  [id number]  Telephone number: [phone]   E-mail address: [email or web address]  Emergency Contact Information: [phone]    Back-Up Physician:  Froy Salomon M.D.  Cardiology  License number:  [id number]  CDTM number:  [id number]  Telephone number:  [phone]  E-mail address: [email or web address]  Emergency Contact Information: [phone]    C.  Pharmacists:   Each of the following pharmacists will serve as the primary pharmacist on CDTM and any pharmacist may serve as the secondary pharmacist for another pharmacists agreement.  Each of the pharmacists listed below has a Pharm.D degree, with completion of an accredited pharmacy practice residency and as well as experience with managing patients along with a physician.  The outpatient monitoring service will be provided under the Cardiology Department at [facility] [place] location in [place] at [address] [place] [address]. The pharmacist will contact patients via telephone from a remote location.    Pharmacist: Diana Bro, Danielle  This pharmacist has completed a pharmacy practice residency and has practiced clinical pharmacy for 7 years. She has experience in the areas of cardiology, acute care, and managed care. She started a pharmacist run hypertension clinic at [facility] during her residency and was published in The American Journal of Health-System Pharmacists.     [place] Pharmacist License Number: [id number]  CDTM number:  [id number]  Contact Number:  [phone]  Contact E-mail: [email or web address]  Emergency Contact Number:  [phone]    Pharmacist:  Megan Larson PharmD  This pharmacist has completed a pharmacy practice residency and has practiced clinical pharmacy for 17 years in the areas of  cardiology, geriatric medicine, anticoagulation management, retail and ambulatory care.  She served as a pharmacy practice clinical preceptor and lead pharmacist in anticoagulation clinic for 10 years.  [place] Pharmacist License # [id number]  CDTM number:  [id number]  Contact Number:  [phone]  Contact E-mail:  [email or web address]   Emergency Contact Number:  [phone]    Pharmacist: Mena Pearl PharmD, BCACP, CDE  This pharmacist has completed a pharmacy practice residency with emphasis in ambulatory care and has practiced clinical pharmacy for 8 years in the areas of dyslipidemia, hypertension, diabetes, and anticoagulation. She is also a Certified Diabetes Educator.      [place] Pharmacist License # [id number]  CDTM number: [id number]  Contact number: [phone]  Contact E-mail:  [email or web address]  Emergency Contact Number: [phone]    Pharmacist: Luda Alvares PharmD  This pharmacist started practicing at [facility] in 2011 following her one year residency at [facility]. She serves as an Adjunct Clinical  in the College of Pharmacy at [facility] [employer]. She served as the lead pharmacist for the Coumadin Clinic team for 4 years.   [place] Pharmacist License: [id number]  CDTM number: [id number]  Contact number: [phone]  Contact E-mail: [email or web address]   Emergency Contact Number: [phone]    Pharmacist:  Iraida Mann PharmD  This pharmacist has completed a pharmacy practice residency (PGY-1) and has practiced clinical pharmacy for 16  years in the areas of heart and abdominal transplant, retail and ambulatory care.  She was directly involved in the care of congestive heart failure, left ventricular assist device and heart transplant patients from [id number].  She is currently practicing as a digital medicine clinical specialist in heart failure.    [place] Pharmacist License # [id number]  CDTM number:   [id number]  Contact Number:  [phone]  Contact E-mail:  [email or web address]   Emergency Contact Number:  [phone]    Pharmacist: Luda Stewart, PeggyD  This pharmacist obtained her Pharm.D. from [employer], and has completed an Ambulatory Care Pharmacy Practice residency at [facility]. She has practiced clinical pharmacy for 9  years and has experience in the areas of Hypertension and Diabetes.      [place] Pharmacist License: [id number]  CDTM Number: [id number]  Telephone number: [phone]  E-mail: [email or web address]  Emergency Contact Number: [phone]      Pharmacist: Monika Roth PharmD  This pharmacist has completed a pharmacy practice residency with an emphasis in ambulatory care and has practiced clinical pharmacy for one year. She has experience in the areas of diabetes, hypertension and dyslipidemia.   [place] Pharmacist License: [id number]  CDTM Number: [id number]  Contact Number: [phone]  Contact Email: [email or web address]   Emergency Contact: [phone]    Pharmacist: Erna Kovacs, PharmD, BCPS  This pharmacist has completed a pharmacy practice residency with an emphasis in ambulatory care and is a Board Certified Pharmacotherapy Specialist (BCPS). She has practiced clinical pharmacy for  years in areas of ambulatory care, internal medicine, cardiology and specialty pharmacy.    [place] Pharmacist License Number: [id number]  CDTM number:  [id number]  Contact Number:  [phone]  Contact E-mail:  [email or web address]   Emergency Contact Number:  [phone]    D.  Eligible Patients  Patients whose antihypertensive therapy is managed under this agreement must have a diagnosis of hypertension as documented in the patient record, and have established care with a provider within [facility]. All aspects of the patients hypertension medication management will be followed in collaboration with  the physician treating the patients hypertension.  The patient will be seen by his or her physician per their discretion or by the recommendation by the hypertension pharmacist.  The patients case may be reviewed with clinic medical personnel as needed, but will be reported at least every 30 days to the collaborating physician regarding the patients drug therapy management. All decisions made by the pharmacist will be recorded in [facility] and are readily available for physician review. All issues outside of the scope of antihypertensive therapy shall be reported to the collaborating physician.     E.  Medications in CDTM  This collaborative practice proposal involves the management of patients who are receiving antihypertensive therapy, specifically, thiazide-type diuretics, angiotensin-converting enzyme inhibtors (ACE-I), angiotensin receptor blockers (ARB), calcium channel blockers (CCB), beta-blockers, loop diuretics, potassium-sparing diuretics, potassium supplementation, aldosterone antagonists, direct renin inhibitors, alpha-1 receptor blockers, central alpha-2 agonists, direct arterial vasodilators and peripheral adrenergic antoagonists, or those patients in which hypertension is controlled by lifestyle modifcation alone.      F.  Clinical Procedure  All patients will be notified that a hypertension CDTM exists.  A signed physician order will be required for each patient to be enrolled into the hypertension CDTM.  Informed consent and an electronic signature will be obtained from each patient and documented in the patients record.  This patient signature will be valid for 1 year, requiring a new physician order and patient consent yearly.  The patient must also be enrolled in the electornic communication program ([facility]) to be elegible for the monitoring program.  The following management plan will be utilized for CDTM:    Patients must submit blood pressures at a minimum of once weekly from the  patient- purchased wireless blood pressure cuff. Patients who fail to comply with this requirement are subject to removal from [facility].   Evaluate the change in blood pressure, if any, from previous measurements performed on the same cuff and arm.  Determine and document contributing factors for blood pressure change.    Review initial drug therapy made by clinic physician upon program enrollment with patient to ensure compliance.    Identify target blood pressure based on age and comorbidities. Therapeutic changes will be made in collaboration with PharmD and collaborating physician based on the AHA/ACC 2017 guidelines for the treatment of hypertension.  Guide drug optimization based on patient response at 2-4 week intervals until control is achieved.  The PharmD will continue to monitor blood pressure and make adjustments to hypertension medications in order to achieve optimal blood pressure.   Order follow up labs when changing or adding ACE inhibitors and diuretics.    Refer to hypertension specialist if  blood pressure goals cannot be achieved using the noted algorithm.  Notify physician with specific problems or request clinic visit if deemed necessary.  Document antihypertensive therapy changes, interventions, and outcomes in EMR.   Provide patient/caregiver continuous education or reinforcement regarding hypertension management,  medications and lifestyle modifications.    Laboratory Tests  Pharmacists will be authorized to order and evaluate laboratory tests directly related to the disease specific drug therapy being managed. Pharmacists will also be authorized to order additional specific labs based on patient clinical presentation or description. Pharmacists may also review other lab data available in the patient record which may be necessary for the evaluation and assessment of the impact on antihypertensive therapy (i.e. drug interaction, disease interaction, etc.).     b.  Documentation   Documentation of  patient encounters and lab results will be permanently placed in the [facility].  Laboratory results will automatically populate prompting review and assessment of each patient.  Laboratory results obtained from outside laboratories will be entered into EMR manually.  This documentation will include lab values, medication changes, identification and assessment of adverse events related to therapy, antihypertensive medication dose adjustments, therapeutic management plan and follow up as well as any other information given to the patient during the telemedicine visit.    c.     1.   will be carried out through quarterly reports tracking following statistics:   a. Percent of patients requiring a change to antihypertensive medications   b. Number of emergency visits due to hypertensive urgency or emergency, hypotension or medication side effects for participating patients  Percent of patients who are controlled (BP <130/80 mmHg)  and uncontrolled (BP>130 mmHg)     2.  Patient specific quality indicators will be identified through quarterly review of the following data:   a.  Average change in blood pressure after a dose adjument by the hypertension pharmacist    3.  A random sample of patient records shall be reviewed by the primary physician quarterly to ensure adherence by the hypertension clinical specialists to the collaborative practice agreement.     4.   measures will be reported to Pharmacy & Therapeutics Committee yearly.     References:    KOURTNEY Bradley, et al. 2017. 2017. Guidelines for the Prevention, Detection, Evaluation and Management of High Blood Pressure in Adults.      Order Specific Question:   BP Control Goal     Answer:   Current (2017) AHA Guidelines    Cardiac rehab phase ii     Standing Status:   Future     Standing Expiration Date:   6/1/2019     Order Specific Question:   Department     Answer:   [facility]     Order Specific  Question:   Select qualifying diagnosis:     Answer:   I21.4 - Non-ST elevation (NSTEMI) myocardial infarction    2D echo with color flow doppler     Standing Status:   Future     Standing Expiration Date:   5/29/2019       Follow up as scheduled. Return sooner for concerns or questions            He expressed verbal understanding and agreed with the plan          Greater than 50% of the visit of 45 minutes was spent counseling, educating, and coordinating the care of the patient.      -In today's visit, at least 4 established conditions that pose a risk to life or bodily function have been addressed and the conditions are severe.    -In today's visit, monitoring for drug toxicity was accomplished.            Patient's Medications   New Prescriptions    No medications on file   Previous Medications    ASPIRIN 81 MG CHEW    Take 81 mg by mouth once daily.    CLOPIDOGREL (PLAVIX) 75 MG TABLET    Take 1 tablet (75 mg total) by mouth once daily.    FENOFIBRATE 160 MG TAB    Take 1 tablet (160 mg total) by mouth once daily.    METOPROLOL TARTRATE (LOPRESSOR) 50 MG TABLET    Take 1 tablet (50 mg total) by mouth 2 (two) times daily.    MULTIVITAMIN (ONE DAILY MULTIVITAMIN) PER TABLET    Take 1 tablet by mouth once daily.    NICOTINE POLACRILEX 2 MG LOZG    Take 1 lozenge (2 mg total) by mouth as needed.    NITROGLYCERIN (NITROSTAT) 0.4 MG SL TABLET    Place 1 tablet (0.4 mg total) under the tongue every 5 (five) minutes as needed for Chest pain.    ROSUVASTATIN (CRESTOR) 10 MG TABLET    Take 1 tablet (10 mg total) by mouth once daily.    VALSARTAN (DIOVAN) 80 MG TABLET    Take 1 tablet (80 mg total) by mouth once daily.   Modified Medications    No medications on file   Discontinued Medications    No medications on file

## 2018-05-29 NOTE — PROGRESS NOTES
Subjective:       Patient ID: Walter Gold is a 62 y.o. male.    Chief Complaint: Hospital follow up    HPI:  Presents to [facility] for hospital follow up.  Recently hospitalized with NSTEMI.  Medication non compliance, discontinuation of medical follow up, and continued tobacco abuse were significant contributing factors.  Underwent LHC via right radial access.  Found to have patent LM and patent LAD.   LCX with chronic total occlusion- known since 2012.  AMANUEL to RCA from 2015.  No further intervention performed.  Discrepancy noted between LV gram and 2 D echocardiogram; subsequent MUGA scan showed EF slightly reduced at 47%.   Medical management maximized and patient discharged to home.    Tolerating recently started Valsartan. (previous questionable ARB intolerance)  Reviewed HgBA1C and TSH- both in acceptable range.  Still abstinent from cigarettes. Enrolled in smoking cessation program.   Sleep study scheduled for June 2018.   He has completed initial hospital follow up with Dr Collazo.  Reports compliance with all medication.     Review of Systems  General ROS: negative for chills, fever or weight loss  Psychological ROS: negative for hallucination, depression or suicidal ideation  Ophthalmic ROS: negative for blurry vision, photophobia or eye pain  ENT ROS: negative for epistaxis, sore throat or rhinorrhea  Respiratory ROS: no cough, shortness of breath, or wheezing  Cardiovascular ROS: no chest pain or dyspnea on exertion  Gastrointestinal ROS: no abdominal pain, change in bowel habits, or black/ bloody stools  Genito-Urinary ROS: no dysuria, trouble voiding, or hematuria  Musculoskeletal ROS: negative for gait disturbance or muscular weakness  Neurological ROS: no syncope or seizures; no ataxia  Dermatological ROS: negative for pruritis, rash and jaundice        Objective:      Vital Signs:  Vitals:    05/29/18 1513   BP: 138/78   Pulse: (!) 55   Temp: 97.8 °F (36.6 °C)     Wt Readings from Last 1  Encounters:   05/29/18 1320 78.5 kg (173 lb)     Body mass index is 26.41 kg/m².   SpO2 Readings from Last 1 Encounters:   05/09/18 99%       Physical Exam:  /78 (BP Location: Left arm, Patient Position: Sitting, BP Method: Small (Automatic))   Pulse (!) 55   Temp 97.8 °F (36.6 °C) (Oral)   Wt 78.8 kg (173 lb 11.6 oz)   SpO2 98%   BMI 26.41 kg/m²   General appearance: alert, cooperative, no distress  Constitutional:Oriented to person, place, and time.appears well-developed and well-nourished.   HEENT: Normocephalic, atraumatic, neck symmetrical, no nasal discharge   Eyes: conjunctivae/corneas clear, PERRL, EOM's intact  Lungs: clear to auscultation bilaterally, no dullness to percussion bilaterally  Heart: regular rate and rhythm without rub; no displacement of the PMI   Abdomen: soft, non-tender; bowel sounds normoactive; no organomegaly  Extremities: extremities symmetric; no clubbing, cyanosis, or edema  Integument: Skin color, texture, turgor normal; no rashes; hair distrubution normal  Neurologic: Alert and oriented X 3, normal strength, normal coordination and gait  Psychiatric: no pressured speech; normal affect; no evidence of impaired cognition    Assessment:       1. NSTEMI (non-ST elevated myocardial infarction)    2. Essential hypertension    3. Sleep apnea, unspecified type    4. Former smoker        Plan:       Diagnoses and all orders for this visit:    NSTEMI (non-ST elevated myocardial infarction)  - recent hospitalization for NSTEMI  - Had [facility] with findings as documented above, no additional intervention performed  - stable on Aspirin, Plavix, Bblocker,  ARB   - ACEI intolerance- cough  - had cardiology follow up 5/29/18 with Dr Collazo     Essential hypertension  - at goal     Sleep apnea, unspecified type  - sleep study pending June 2018     Former smoker  - enrolled in smoking cessation program       Patient will be released from [facility].  Has appointment to establish new  primary care, Dr Vanessa Kovacs, 7/11/18.   Scheduled Follow-up :  Future Appointments  Date Time Provider Department Center   6/6/2018 10:00 AM Janes Arroyo RRT [facility] SMOKE [facility]   6/18/2018 7:30 PM LAB, SLEEP TENZIN [facility] SLEEP [facility]   6/19/2018 9:00 [facility]   7/11/2018 2:00 PM Vanessa Kovacs MD [facility] FAMCT[facility] [place]       Post Visit Medication List:     Medication List          Accurate as of 5/29/18 11:59 PM. If you have any questions, ask your nurse or doctor.               CONTINUE taking these medications    aspirin 81 MG Chew     clopidogrel 75 mg tablet  Commonly known as:  PLAVIX  Take 1 tablet (75 mg total) by mouth once daily.     fenofibrate 160 MG Tab  Take 1 tablet (160 mg total) by mouth once daily.     metoprolol tartrate 50 MG tablet  Commonly known as:  LOPRESSOR  Take 1 tablet (50 mg total) by mouth 2 (two) times daily.     nicotine polacrilex 2 MG Lozg  Take 1 lozenge (2 mg total) by mouth as needed.     nitroGLYCERIN 0.4 MG SL tablet  Commonly known as:  NITROSTAT  Place 1 tablet (0.4 mg total) under the tongue every 5 (five) minutes as needed for Chest pain.     ONE DAILY MULTIVITAMIN per tablet  Generic drug:  multivitamin     rosuvastatin 10 MG tablet  Commonly known as:  CRESTOR  Take 1 tablet (10 mg total) by mouth once daily.     valsartan 80 MG tablet  Commonly known as:  DIOVAN  Take 1 tablet (80 mg total) by mouth once daily.

## 2018-06-01 NOTE — PATIENT INSTRUCTIONS
Heart Disease Education    The heart beats 60 to 100 times per minute, 24 hours a day. This equals almost 1000,000 times a day. It pumps blood with oxygen and nutrients to the tissues and organs of the body. But the heart is a muscle and needs its own supply of blood. Blood flow to the heart is supplied by the coronary arteries. Coronary artery disease (atherosclerosis) is a result of cholesterol, saturated fat, and calcium deposits (plaques) that build up inside the walls. This causes inflammation within the coronary arteries. These plaques narrow the artery and reduce blood flow to the heart muscle. The reduction in blood flow to the heart muscle decreases oxygen supply to the heart. If the narrowing is significant enough, the oxygen supply to one or more regions of the heart can be temporarily or permanently shut down. This can cause chest pain, and possibly death of heart tissue (heart attack).  Types of chest pain  Angina is the name for pain in the heart muscle. Angina is a warning sign of serious heart disease. When untreated it can lead to a heart attack, also known as acute myocardial infarction, or AMI. Angina occurs when there is not enough blood and oxygen flowing to the heart for the amount of work it is doing. This most often happens during physical exertion, when the heart is working hardest. It is usually relieved by rest or nitroglycerin. Angina may also occur after a large meal when extra blood is sent to the digestive organs and less goes to the heart. In the case of advanced or unstable heart disease, angina can occur at rest or awaken you from sleep. Angina usually lasts from a few minutes up to 20 minutes or more. When treated early, the effects of angina can be reversed without permanent damage to the heart. Angina is a serious condition and needs to be evaluated by a medical professional immediately.  There are two types of angina -- stable and unstable:  · Stable angina usually occurs  with a predictable level of activity. Being stable, its character, severity, and occurrence do not change much over time. It usually starts with activity, and resolves with rest or taking your medicine as instructed by your doctor. The symptoms usually do not last long.  · Unstable angina changes or gets worse over time. It is different from whatever you are used to. It may feel different or worse, begin without cause, occur with exercise or exertion, wake you up from sleep, and last longer. It may not respond in the same way as it does when you take your usual medicines for an attack. This type of angina can be a warning sign of an impending heart attack.     A heart attack is usually the result of a blood clot that suddenly forms in a coronary artery that has been narrowed with plaque. When this occurs, blood flow may be cut off to a part of the heart muscle, causing the cells to die. This weakens the pumping action of the heart, which affects the delivery of blood to all the other organs in the body including the brain. This damage is not reversible. However, early treatment can limit the amount of damage.  The pain you feel with angina and a heart attack may have a similar quality. However, it is usually different in intensity and duration. Here are some typical descriptions of a heart attack:  · It is most often experienced as a squeezing, crushing, pressure-like sensation in the center of the chest.  · It is sometimes described as something heavy sitting on my chest.  · It may feel more like a bad case of indigestion.  · The pain may spread from the chest to the arm, shoulder, throat or jaw.  · Sometimes the pain is not felt in the chest at all, but only in the arm, shoulder, throat or jaw.  · There may also be nausea, vomiting, dizziness or light-headedness, sweating and trouble breathing.  · Palpitations, or your heart beating rapidly  · A new, irregular heart beat  · Unexplained weakness  You may not be  "able to tell the difference between "bad" angina and a heart attack at home. Seek help if your symptoms are different than usual. Do not be in denial or just try to "tough it out."  Call 911  This is the fastest and safest way to get to the emergency department. The paramedics can also start treatment on the way to the hospital, saving valuable time for your heart.  · If the angina gets worse, if it continues, or if it stops and returns, call 911 immediately. Do not delay. You may be having a heart attack.  · After you call 911, take a second tablet or spray unless instructed otherwise. When repeating doses, sit down if possible, because it can make you feel lightheaded or dizzy. Wait another 5 minutes. If the angina still does not go away, take a third tablet or spray. Do not take more than 3 tablets or sprays within 15 minutes. Stay on the phone with 911 for further instruction.  · Your healthcare provider may give you slightly different instructions than those above. If so, follow them carefully.  Do not wait until symptoms become severe to call 911.  Other reasons to call 911 include:  · Trouble breathing  · Feeling lightheaded, faint, or dizzy  · Rapid heart beat  · Slower than usual heart rate compared to your normal  · Angina with weakness, dizziness, fainting, heavy sweating, nausea, or vomiting  · Extreme drowsiness, confusion  · Weakness of an arm or leg or one side of the face  · Difficulty with speech or vision  When to seek medical care  Remember, the signs and symptoms of a heart attack are not always like they are on TV. Sometimes they are not so obvious. You may only feel weak, or just not right. If it is not clear or if you have any doubt, call for advice.  · Seek help if there is a change in the type of pain, if it feels different, or if your symptoms are mild.  · Do not drive yourself. Have someone else drive you. If no one can drive, call 911.  · Do not delay. Fast diagnosis and treatment can " "prevent or limit the amount of heart damage during a heart attack.  · Do not go to your doctor's office or a clinic as they may not be able to provide all the testing and treatment required for this condition.  · If your doctor has given you medicine to take when symptoms occur, take them but don't delay getting help trying to locate medicines.  What happens in the emergency department  The emergency department is connected to your local emergency medical system (EMS) through 911. That's why during a cardiac emergency, calling 911 is the fastest way to get help. The goal of the emergency department is to rapidly screen, evaluate, and treat people.  Once you are there, an electrocardiogram (ECG or heart tracing) will be done. Blood samples may be taken to look for the presence of heart enzymes that leak from damaged heart cells and show if a heart attack is occurring. You will often be evaluated by a heart specialist (cardiologist) who decides the best course of action. In the case of severe angina or early heart attack, and depending on the circumstances, powerful "clot busting" medicines can be used to dissolve blood clots in the coronary artery. In other cases, you may be taken to a cardiac catheterization lab. Here, a tiny balloon-tipped catheter is advanced through blood vessels to the heart. There the balloon is inflated pushing open the blood vessel restoring blood flow.  Risk factors for heart disease  Risk factors for heart disease are a combination of genetic and lifestyle. Many risk factors work by either directly or indirectly damaging the blood vessels of the heart, or by increasing the risk of forming blood or cholesterol clots, which then clog up and block the arteries.     Examples of physical lifestyle risk factors:  · Cigarette smoking  · High blood pressure  · High blood cholesterol  · Use of stimulant drugs such as cocaine, crack, and amphetamines  · Eating a high-fat, high-cholesterol " meal  · Diabetes   · Obesity which increases risk for diabetes and high blood pressure  · Lack of regular physical activity     Examples of emotional lifestyle factors:  · Chronic high stress levels release stress hormones. These raise blood pressure and cholesterol level and makes blood clot more easily.  · Held-in anger, hostile or cynical attitude  · Social and emotional isolation, lack of intimacy  · Loss of relationship  · Depression  Other factors that increase the risk of heart attack that you cannot control :  · Age. The older you get beyond 40, the greater is your risk of significant coronary artery disease.  · Gender. More men than women get heart disease; but once past menopause, women who are not taking estrogen replacement have the same risk as men for a heart attack.  · Family history. If your mother, father, brother or sister has coronary artery disease, your risk of having it is higher than a person your age without this family history.  What can you do to decrease your risk  To reduce your risk of heart disease:  · Get regular checkups with your doctor.  · Take your medicines for blood pressure, cholesterol or diabetes as directed.  · Watch your diet. Eat a heart healthy diet choosing fresh foods, less salt, cholesterol, and fat  · Stop smoking. Get help if needed.  · Get regular exercise.  · Manage stress.  · Carry a list of medicines and doses in your wallet.  Date Last Reviewed: 12/30/2015  © 7025-1386 [employer]. [address] [place] [address], [place] [address]. All rights reserved. This information is not intended as a substitute for professional medical care. Always follow your healthcare professional's instructions.

## 2018-06-06 ENCOUNTER — CLINICAL SUPPORT (OUTPATIENT)
Dept: SMOKING CESSATION | Facility: CLINIC | Age: 63
End: 2018-06-06
Payer: COMMERCIAL

## 2018-06-06 DIAGNOSIS — F17.200 NICOTINE DEPENDENCE: Primary | ICD-10-CM

## 2018-06-06 PROCEDURE — 90853 GROUP PSYCHOTHERAPY: CPT | Mod: S$GLB,,, | Performed by: GENERAL PRACTICE

## 2018-06-06 PROCEDURE — 99999 PR PBB SHADOW E&M-EST. PATIENT-LVL I: CPT | Mod: PBBFAC,,,

## 2018-06-06 NOTE — Clinical Note
Patient states that he has not smoked a cigarette in 2 weeks. Patient also states that he has not used any NRT's during that time. CO= 3 ppm.

## 2018-06-07 NOTE — PROGRESS NOTES
Site: [facility]  Date:  6/6/2018  Clinical Status of Patient: Outpatient   Length of Service and Code: 90 minutes - 00259   Number in Attendance: 9  Group Activities/Focus of Group:  Completion of TCRS (Tobacco Cessation Rating Scale) learned addiction model, cues/triggers, personal reasons for quitting, medications, goals, quit date    Target symptoms:  withdrawal and medication side effects             The following were rated moderate (3) to severe (4) on TCRS:       Moderate 3: None     Severe 4:   None  Patient's Response to Intervention: Patient states that he has not smoked a cigarette in 2 weeks. Patient also states that he has not used any NRT's during that time. CO= 3 ppm.    Progress Toward Goals and Other Mental Status Changes: Patient denies any behavioral or mental status changes.       Diagnosis: Z72.0  Plan: The patient will continue with group therapy sessions and medication regimen prescribed with management by physician or Cessation Clinic Provider. Patient will inform Smoking Clinic Cessation Counselor of symptoms as rated high on TCRS.    Return to Clinic: 2 weeks

## 2018-06-18 ENCOUNTER — HOSPITAL ENCOUNTER (OUTPATIENT)
Dept: SLEEP MEDICINE | Facility: HOSPITAL | Age: 63
Discharge: HOME | End: 2018-06-18
Attending: INTERNAL MEDICINE
Payer: MEDICARE

## 2018-06-18 DIAGNOSIS — G47.30 SLEEP APNEA, UNSPECIFIED TYPE: ICD-10-CM

## 2018-06-18 PROCEDURE — 95810 PR POLYSOMNOGRAPHY, 4 OR MORE: ICD-10-PCS | Mod: 26,,, | Performed by: INTERNAL MEDICINE

## 2018-06-18 PROCEDURE — 95810 POLYSOM 6/> YRS 4/> PARAM: CPT | Mod: 26,,, | Performed by: INTERNAL MEDICINE

## 2018-06-18 PROCEDURE — 95810 POLYSOM 6/> YRS 4/> PARAM: CPT

## 2018-06-19 ENCOUNTER — HOSPITAL ENCOUNTER (OUTPATIENT)
Dept: CARDIOLOGY | Facility: HOSPITAL | Age: 63
Discharge: HOME | End: 2018-06-19
Attending: INTERNAL MEDICINE
Payer: MEDICARE

## 2018-06-19 DIAGNOSIS — I25.118 CORONARY ARTERY DISEASE INVOLVING NATIVE CORONARY ARTERY OF NATIVE HEART WITH OTHER FORM OF ANGINA PECTORIS: ICD-10-CM

## 2018-06-19 LAB
AORTIC VALVE REGURGITATION: ABNORMAL
DIASTOLIC DYSFUNCTION: NO
MITRAL VALVE MOBILITY: NORMAL
MITRAL VALVE REGURGITATION: ABNORMAL
RETIRED EF AND QEF - SEE NOTES: 35 (ref 55–65)

## 2018-06-19 PROCEDURE — 93306 TTE W/DOPPLER COMPLETE: CPT | Mod: 26,,, | Performed by: INTERNAL MEDICINE

## 2018-06-19 PROCEDURE — 93306 TTE W/DOPPLER COMPLETE: CPT | Mod: PO

## 2018-06-19 NOTE — PROGRESS NOTES
This is a Screen sudy for 62 year old Walter Gold.  The procedure was explained to the patient upon arrival, this included the set-up process and what to expect during the night.   It was also explained that the test will be interpreted by a physician and the results will be sent to his PCP.   His EKG appeared to be NSR. occasional PVC  Occasional mild - moderate snoring obsereved. Sleep disorder breathing most significant in supine Sleep.  He did not meet medicare criteria for a split night study.  A thank you letter was given to the patient upon leaving the sleep lab that explains the next steps regarding the sleep study and the treatment, if needed.

## 2018-06-27 ENCOUNTER — CLINICAL SUPPORT (OUTPATIENT)
Dept: SMOKING CESSATION | Facility: CLINIC | Age: 63
End: 2018-06-27
Payer: COMMERCIAL

## 2018-06-27 DIAGNOSIS — F17.200 NICOTINE DEPENDENCE: Primary | ICD-10-CM

## 2018-06-27 PROCEDURE — 99999 PR PBB SHADOW E&M-EST. PATIENT-LVL I: CPT | Mod: PBBFAC,,,

## 2018-06-27 PROCEDURE — 90853 GROUP PSYCHOTHERAPY: CPT | Mod: S$GLB,,, | Performed by: GENERAL PRACTICE

## 2018-06-27 NOTE — Clinical Note
Patient states that he is smoking 2 cpd. Patient currently on 2 mg nicotine lozenge and states that he uses one lozenge per day. Patient reports no negative side effects. CO= 5 ppm with having had last cigarette 4 pm yesterday evening.

## 2018-06-27 NOTE — PROGRESS NOTES
Site: [facility]  Date:  6/27/2018  Clinical Status of Patient: Outpatient   Length of Service and Code: 60 minutes - 86455   Number in Attendance: 3  Group Activities/Focus of Group: Completion of TCRS (Tobacco Cessation Rating Scale) learned addiction model, cues/triggers, personal reasons for quitting, medications, goals, quit date.     Target symptoms:  withdrawal and medication side effects             The following were rated moderate (3) to severe (4) on TCRS:       Moderate 3: None     Severe 4:   None  Patient's Response to Intervention: Patient states that he is smoking 2 cpd. Patient currently on 2 mg nicotine lozenge and states that he uses one lozenge per day. Patient reports no negative side effects. CO= 5 ppm with having had last cigarette 4 pm yesterday evening.   Progress Toward Goals and Other Mental Status Changes: Patient denies any behavioral or mental status changes.       Diagnosis: Z72.0  Plan: The patient will continue with group therapy sessions and medication regimen prescribed with management by physician or Cessation Clinic Provider. Patient will inform Smoking Clinic Cessation Counselor of symptoms as rated high on TCRS.    Return to Clinic: 2 weeks

## 2018-07-11 ENCOUNTER — OFFICE VISIT (OUTPATIENT)
Dept: FAMILY MEDICINE | Facility: CLINIC | Age: 63
End: 2018-07-11
Payer: MEDICARE

## 2018-07-11 ENCOUNTER — CLINICAL SUPPORT (OUTPATIENT)
Dept: SMOKING CESSATION | Facility: CLINIC | Age: 63
End: 2018-07-11
Payer: COMMERCIAL

## 2018-07-11 VITALS
TEMPERATURE: 98 F | RESPIRATION RATE: 18 BRPM | WEIGHT: 179 LBS | HEART RATE: 59 BPM | BODY MASS INDEX: 27.13 KG/M2 | DIASTOLIC BLOOD PRESSURE: 78 MMHG | HEIGHT: 68 IN | SYSTOLIC BLOOD PRESSURE: 158 MMHG | OXYGEN SATURATION: 99 %

## 2018-07-11 DIAGNOSIS — I25.118 CORONARY ARTERY DISEASE INVOLVING NATIVE CORONARY ARTERY OF NATIVE HEART WITH OTHER FORM OF ANGINA PECTORIS: ICD-10-CM

## 2018-07-11 DIAGNOSIS — G47.30 SLEEP APNEA, UNSPECIFIED TYPE: ICD-10-CM

## 2018-07-11 DIAGNOSIS — I10 ESSENTIAL HYPERTENSION: ICD-10-CM

## 2018-07-11 DIAGNOSIS — Z00.00 ANNUAL PHYSICAL EXAM: Primary | ICD-10-CM

## 2018-07-11 DIAGNOSIS — E78.5 HYPERLIPIDEMIA LDL GOAL <70: ICD-10-CM

## 2018-07-11 DIAGNOSIS — Z12.11 COLON CANCER SCREENING: ICD-10-CM

## 2018-07-11 DIAGNOSIS — F17.200 NICOTINE DEPENDENCE: Primary | ICD-10-CM

## 2018-07-11 PROCEDURE — 99999 PR PBB SHADOW E&M-EST. PATIENT-LVL IV: CPT | Mod: PBBFAC,,, | Performed by: FAMILY MEDICINE

## 2018-07-11 PROCEDURE — 99214 OFFICE O/P EST MOD 30 MIN: CPT | Mod: PBBFAC,PN | Performed by: FAMILY MEDICINE

## 2018-07-11 PROCEDURE — 99999 PR PBB SHADOW E&M-EST. PATIENT-LVL I: CPT | Mod: PBBFAC,,,

## 2018-07-11 PROCEDURE — 90853 GROUP PSYCHOTHERAPY: CPT | Mod: S$GLB,,, | Performed by: GENERAL PRACTICE

## 2018-07-11 PROCEDURE — 99213 OFFICE O/P EST LOW 20 MIN: CPT | Mod: S$PBB,,, | Performed by: FAMILY MEDICINE

## 2018-07-11 NOTE — PROGRESS NOTES
HPI:  Walter Gold is a 62 y.o. year old male that  presents to become [facility]. He notices that over the past year his BP has been higher than normal.  He denies HA , SOB, or chest pain.  Chief Complaint   Patient presents with    [facility]     colonoscopy, hep C    Follow-up     Sleep study   .     HPI    Past Medical History:   Diagnosis Date    Coronary artery disease     Hyperlipidemia     Hypertension     Sleep apnea 5/9/2018     Social History     Social History    Marital status:      Spouse name: N/A    Number of children: N/A    Years of education: N/A     Occupational History    Not on file.     Social History Main Topics    Smoking status: Former Smoker     Packs/day: 0.50     Types: Cigarettes     Start date: 6/25/2014    Smokeless tobacco: Not on file    Alcohol use Yes    Drug use: No    Sexual activity: Not on file     Other Topics Concern    Not on file     Social History Narrative    No narrative on file     History reviewed. No pertinent surgical history.  Family History   Problem Relation Age of Onset    Hyperlipidemia Mother     Heart disease Brother            Review of Systems  General ROS: negative for chills, fever or weight loss  Psychological ROS: negative for hallucination, depression or suicidal ideation  Ophthalmic ROS: negative for blurry vision, photophobia or eye pain  ENT ROS: negative for epistaxis, sore throat or rhinorrhea  Respiratory ROS: no cough, shortness of breath, or wheezing  Cardiovascular ROS: no chest pain or dyspnea on exertion  Gastrointestinal ROS: no abdominal pain, change in bowel habits, or black/ bloody stools  Genito-Urinary ROS: no dysuria, trouble voiding, or hematuria  Musculoskeletal ROS: negative for gait disturbance or muscular weakness  Neurological ROS: no syncope or seizures; no ataxia  Dermatological ROS: negative for pruritis, rash and jaundice      Physical Exam:  BP (!) 158/78   Pulse (!) 59   Temp 97.7 °F  "(36.5 °C) (Oral)   Resp 18   Ht 5' 8" (1.727 m)   Wt 81.2 kg (179 lb)   SpO2 99%   BMI 27.22 kg/m²   General appearance: alert, cooperative, no distress  Constitutional:Oriented to person, place, and time.appears well-developed and well-nourished.  HEENT: Normocephalic, atraumatic, neck symmetrical, no nasal discharge, TM - clear bilaterally   Eyes: conjunctivae/corneas clear, PERRL, EOM's intact  Lungs: clear to auscultation bilaterally, no dullness to percussion bilaterally  Heart: regular rate and rhythm without rub; no displacement of the PMI   Abdomen: soft, non-tender; bowel sounds normoactive; no organomegaly  Extremities: extremities symmetric; no clubbing, cyanosis, or edema  Integument: Skin color, texture, turgor normal; no rashes; hair distrubution normal  Neurologic: Alert and oriented X 3, normal strength, normal coordination and gait  Psychiatric: no pressured speech; normal affect; no evidence of impaired cognition   Physical Exam  LABS:    Complete Blood Count  Lab Results   Component Value Date    RBC 4.70 04/25/2018    HGB 13.0 (L) 04/25/2018    HCT 39.6 (L) 04/25/2018    MCV 84 04/25/2018    MCH 27.7 04/25/2018    MCHC 32.8 04/25/2018    RDW 13.5 04/25/2018     04/25/2018    MPV 9.2 04/25/2018    GRAN 54.0 04/25/2018    LYMPH CANCELED 04/25/2018    LYMPH 34.0 04/25/2018    MONO CANCELED 04/25/2018    MONO 8.0 04/25/2018    EOS CANCELED 04/25/2018    BASO CANCELED 04/25/2018    EOSINOPHIL 4.0 04/25/2018    BASOPHIL 0.0 04/25/2018    DIFFMETHOD Manual 04/25/2018       Comprehensive Metabolic Panel  Lab Results   Component Value Date    GLU 87 04/24/2018    BUN 12 04/24/2018    CREATININE 0.7 04/24/2018     04/24/2018    K 4.0 04/24/2018     04/24/2018    PROT 7.8 04/23/2018    ALBUMIN 3.6 04/23/2018    BILITOT 0.4 04/23/2018    AST 12 04/23/2018    ALKPHOS 78 04/23/2018    CO2 23 04/24/2018    ALT 7 (L) 04/23/2018    ANIONGAP 9 04/24/2018    EGFRNONAA >60 04/24/2018    " ESTGFRAFRICA >60 04/24/2018       LIPID  Lab Results   Component Value Date    CHOL 339 (H) 04/24/2018    CHOL 339 (H) 04/24/2018    HDL 41 04/24/2018    HDL 41 04/24/2018       TSH  Lab Results   Component Value Date    TSH 2.267 05/09/2018       Current Outpatient Prescriptions   Medication Sig Dispense Refill    aspirin 81 MG Chew Take 81 mg by mouth once daily.      clopidogrel (PLAVIX) 75 mg tablet Take 1 tablet (75 mg total) by mouth once daily. 30 tablet 11    fenofibrate 160 MG Tab Take 1 tablet (160 mg total) by mouth once daily. 30 tablet 11    metoprolol tartrate (LOPRESSOR) 50 MG tablet Take 1 tablet (50 mg total) by mouth 2 (two) times daily. 60 tablet 11    multivitamin (ONE DAILY MULTIVITAMIN) per tablet Take 1 tablet by mouth once daily.      nicotine polacrilex 2 MG Lozg Take 1 lozenge (2 mg total) by mouth as needed. 108 lozenge 0    nitroGLYCERIN (NITROSTAT) 0.4 MG SL tablet Place 1 tablet (0.4 mg total) under the tongue every 5 (five) minutes as needed for Chest pain. 25 tablet 11    rosuvastatin (CRESTOR) 10 MG tablet Take 1 tablet (10 mg total) by mouth once daily. 30 tablet 11    valsartan (DIOVAN) 80 MG tablet Take 1 tablet (80 mg total) by mouth once daily. 90 tablet 3     No current facility-administered medications for this visit.        Assessment:    ICD-10-CM ICD-9-CM    1. Annual physical exam Z00.00 V70.0 Comprehensive metabolic panel      Lipid panel      CBC auto differential      TSH      Comprehensive metabolic panel      Lipid panel      CBC auto differential      TSH   2. Essential hypertension I10 401.9 Comprehensive metabolic panel      Lipid panel      CBC auto differential      TSH      Comprehensive metabolic panel      Lipid panel      CBC auto differential      TSH      CANCELED: Comprehensive metabolic panel      CANCELED: CBC auto differential      CANCELED: Lipid panel      CANCELED: TSH   3. Hyperlipidemia LDL goal <70 E78.5 272.4 Lipid panel      Lipid panel       CANCELED: Lipid panel   4. Colon cancer screening Z12.11 V76.51 Case request GI: COLONOSCOPY   5. Coronary artery disease involving native coronary artery of native heart with other form of angina pectoris I25.118 414.01      413.9    6. Sleep apnea, unspecified type G47.30 780.57          Plan:    Follow-up in 3 weeks (on 7/30/2018).          Vanessa Kovacs MD

## 2018-07-11 NOTE — Clinical Note
Patient states that he is smoking 2 cpd. Patient is currently on 2 mg nicotine lozenge as needed in place of cigarettes. Patient states that he is using 1-2 lozenges per day. Patient reports no negative side effects. CO= 2 ppm with having had last cigarette at 4 pm yesterday.

## 2018-07-11 NOTE — LETTER
July 16, 2018      Linda Mar MD  [address]  [address]  [facility] [address]           [address] [address]  Phone: [phone]  Fax: [phone]          Patient: Walter Gold   MR Number: [id number]   YOB: 1955   Date of Visit: 7/11/2018       Dear Dr. Linda Mar:    Thank you for referring Walter Gold to me for evaluation. Attached you will find relevant portions of my assessment and plan of care.    If you have questions, please do not hesitate to call me. I look forward to following Walter Gold along with you.    Sincerely,    Vanessa Kovacs MD    Enclosure  CC:  No Recipients    If you would like to receive this communication electronically, please contact [email or web address] or [phone] to request more information on [employer] Link access.    For providers and/or their staff who would like to refer a patient to [facility], please contact us through our one-stop-shop provider referral line, [facility], at [phone].    If you feel you have received this communication in error or would no longer like to receive these types of communications, please e-mail [email or web address]

## 2018-07-12 NOTE — PROGRESS NOTES
Site: [facility]  Date:  7/11/2018  Clinical Status of Patient: Outpatient   Length of Service and Code: 90 minutes - 62661   Number in Attendance: 3  Group Activities/Focus of Group: Client introductions, completion of TCRS (Tobacco Cessation Rating Scale) learned addiction model, cues/triggers, personal reasons for quitting, medications, goals, quit date.     Target symptoms:  withdrawal and medication side effects             The following were rated moderate (3) to severe (4) on TCRS:       Moderate 3: None     Severe 4:   None  Patient's Response to Intervention: Patient states that he is smoking 2 cpd. Patient is currently on 2 mg nicotine lozenge as needed in place of cigarettes. Patient states that he is using 1-2 lozenges per day. Patient reports no negative side effects. CO= 2 ppm with having had last cigarette at 4 pm yesterday.   Progress Toward Goals and Other Mental Status Changes: Patient denies any behavioral or mental status changes.       Diagnosis: Z72.0  Plan: The patient will continue with group therapy sessions and medication regimen prescribed with management by physician or Cessation Clinic Provider. Patient will inform Smoking Clinic Cessation Counselor of symptoms as rated high on TCRS.    Return to Clinic: 2 weeks

## 2018-07-13 DIAGNOSIS — Z12.11 COLON CANCER SCREENING: ICD-10-CM

## 2018-07-13 DIAGNOSIS — Z11.59 NEED FOR HEPATITIS C SCREENING TEST: ICD-10-CM

## 2018-07-16 ENCOUNTER — TELEPHONE (OUTPATIENT)
Dept: FAMILY MEDICINE | Facility: CLINIC | Age: 63
End: 2018-07-16

## 2018-07-16 ENCOUNTER — LAB VISIT (OUTPATIENT)
Dept: LAB | Facility: HOSPITAL | Age: 63
End: 2018-07-16
Attending: FAMILY MEDICINE
Payer: MEDICARE

## 2018-07-16 DIAGNOSIS — G47.30 SLEEP APNEA, UNSPECIFIED TYPE: Primary | ICD-10-CM

## 2018-07-16 DIAGNOSIS — Z11.59 NEED FOR HEPATITIS C SCREENING TEST: ICD-10-CM

## 2018-07-16 PROCEDURE — 36415 COLL VENOUS BLD VENIPUNCTURE: CPT | Mod: PO

## 2018-07-16 PROCEDURE — 86803 HEPATITIS C AB TEST: CPT | Mod: PO

## 2018-07-16 NOTE — TELEPHONE ENCOUNTER
----- Message from Vanessa Kovacs MD sent at 7/16/2018  2:24 AM CDT -----  Send an order for CPAP titration

## 2018-07-16 NOTE — TELEPHONE ENCOUNTER
Patient had sleep study on 6/18/18, with Dr. Mar. Dr. Kovacs went over sleep study with patient and patient needs to have order for CPAP titration. Is this something Dr. Mar needs to do or his PCP. Please advise.

## 2018-07-17 LAB — HCV AB SERPL QL IA: NEGATIVE

## 2018-07-18 ENCOUNTER — TELEPHONE (OUTPATIENT)
Dept: ENDOSCOPY | Facility: HOSPITAL | Age: 63
End: 2018-07-18

## 2018-07-18 ENCOUNTER — TELEPHONE (OUTPATIENT)
Dept: FAMILY MEDICINE | Facility: CLINIC | Age: 63
End: 2018-07-18

## 2018-07-18 NOTE — TELEPHONE ENCOUNTER
----- Message from Constanza Xiao sent at 7/18/2018  2:46 PM CDT -----  Contact: [phone]/wife  Patient's wife is requesting to speak with you regarding 's lab work. Please call and advise.

## 2018-07-18 NOTE — TELEPHONE ENCOUNTER
Patient has a colonoscopy ordered. Is it okay for patient to hold Plavix 5 days prior to procedure? Please advise.    Thank you,  Alanis

## 2018-07-19 ENCOUNTER — TELEPHONE (OUTPATIENT)
Dept: ENDOSCOPY | Facility: HOSPITAL | Age: 63
End: 2018-07-19

## 2018-07-19 LAB
ALBUMIN SERPL-MCNC: 4.3 G/DL (ref 3.6–5.1)
ALBUMIN/GLOB SERPL: 1.6 (CALC) (ref 1–2.5)
ALP SERPL-CCNC: 50 U/L (ref 40–115)
ALT SERPL-CCNC: 8 U/L (ref 9–46)
AST SERPL-CCNC: 12 U/L (ref 10–35)
BASOPHILS # BLD AUTO: 70 CELLS/UL (ref 0–200)
BASOPHILS NFR BLD AUTO: 0.8 %
BILIRUB SERPL-MCNC: 0.4 MG/DL (ref 0.2–1.2)
BUN SERPL-MCNC: 18 MG/DL (ref 7–25)
BUN/CREAT SERPL: ABNORMAL (CALC) (ref 6–22)
CALCIUM SERPL-MCNC: 9.4 MG/DL (ref 8.6–10.3)
CHLORIDE SERPL-SCNC: 104 MMOL/L (ref 98–110)
CHOLEST SERPL-MCNC: 248 MG/DL
CHOLEST/HDLC SERPL: 3.7 (CALC)
CO2 SERPL-SCNC: 29 MMOL/L (ref 20–31)
CREAT SERPL-MCNC: 1 MG/DL (ref 0.7–1.25)
EOSINOPHIL # BLD AUTO: 531 CELLS/UL (ref 15–500)
EOSINOPHIL NFR BLD AUTO: 6.1 %
ERYTHROCYTE [DISTWIDTH] IN BLOOD BY AUTOMATED COUNT: 15.6 % (ref 11–15)
GFR SERPL CREATININE-BSD FRML MDRD: 79 ML/MIN/1.73M2
GLOBULIN SER CALC-MCNC: 2.7 G/DL (CALC) (ref 1.9–3.7)
GLUCOSE SERPL-MCNC: 104 MG/DL (ref 65–99)
HCT VFR BLD AUTO: 40.2 % (ref 38.5–50)
HDLC SERPL-MCNC: 67 MG/DL
HGB BLD-MCNC: 13.2 G/DL (ref 13.2–17.1)
LDLC SERPL CALC-MCNC: 165 MG/DL (CALC)
LYMPHOCYTES # BLD AUTO: 4976 CELLS/UL (ref 850–3900)
LYMPHOCYTES NFR BLD AUTO: 57.2 %
MCH RBC QN AUTO: 27.6 PG (ref 27–33)
MCHC RBC AUTO-ENTMCNC: 32.8 G/DL (ref 32–36)
MCV RBC AUTO: 83.9 FL (ref 80–100)
MONOCYTES # BLD AUTO: 618 CELLS/UL (ref 200–950)
MONOCYTES NFR BLD AUTO: 7.1 %
NEUTROPHILS # BLD AUTO: 2506 CELLS/UL (ref 1500–7800)
NEUTROPHILS NFR BLD AUTO: 28.8 %
NONHDLC SERPL-MCNC: 181 MG/DL (CALC)
PLATELET # BLD AUTO: 307 THOUSAND/UL (ref 140–400)
PMV BLD REES-ECKER: 9.9 FL (ref 7.5–12.5)
POTASSIUM SERPL-SCNC: 4.2 MMOL/L (ref 3.5–5.3)
PROT SERPL-MCNC: 7 G/DL (ref 6.1–8.1)
RBC # BLD AUTO: 4.79 MILLION/UL (ref 4.2–5.8)
SODIUM SERPL-SCNC: 140 MMOL/L (ref 135–146)
TRIGL SERPL-MCNC: 63 MG/DL
TSH SERPL-ACNC: 2.02 MIU/L (ref 0.4–4.5)
WBC # BLD AUTO: 8.7 THOUSAND/UL (ref 3.8–10.8)

## 2018-07-23 DIAGNOSIS — Z12.11 SPECIAL SCREENING FOR MALIGNANT NEOPLASMS, COLON: Primary | ICD-10-CM

## 2018-07-23 RX ORDER — POLYETHYLENE GLYCOL 3350, SODIUM SULFATE ANHYDROUS, SODIUM BICARBONATE, SODIUM CHLORIDE, POTASSIUM CHLORIDE 236; 22.74; 6.74; 5.86; 2.97 G/4L; G/4L; G/4L; G/4L; G/4L
4 POWDER, FOR SOLUTION ORAL ONCE
Qty: 4000 ML | Refills: 0 | Status: SHIPPED | OUTPATIENT
Start: 2018-07-23 | End: 2018-07-23

## 2018-07-25 ENCOUNTER — TELEPHONE (OUTPATIENT)
Dept: FAMILY MEDICINE | Facility: CLINIC | Age: 63
End: 2018-07-25

## 2018-07-25 ENCOUNTER — CLINICAL SUPPORT (OUTPATIENT)
Dept: FAMILY MEDICINE | Facility: CLINIC | Age: 63
End: 2018-07-25
Payer: MEDICARE

## 2018-07-25 VITALS — DIASTOLIC BLOOD PRESSURE: 78 MMHG | SYSTOLIC BLOOD PRESSURE: 126 MMHG

## 2018-07-25 NOTE — TELEPHONE ENCOUNTER
----- Message from Lara Burrell sent at 7/25/2018  8:29 AM CDT -----  Contact: Self [phone]  Patient is calling to talk to nurse concerning his blood pressure being elevated. Please advice

## 2018-07-25 NOTE — TELEPHONE ENCOUNTER
Patient scheduled for 7/30. Patient coming in office today for nurse visit for blood pressure check.

## 2018-07-25 NOTE — PROGRESS NOTES
Patient came in for blood pressure check. Informed patient blood pressure has improved a lot. Informed patient to keep appointment.

## 2018-07-26 ENCOUNTER — CLINICAL SUPPORT (OUTPATIENT)
Dept: SMOKING CESSATION | Facility: CLINIC | Age: 63
End: 2018-07-26
Payer: COMMERCIAL

## 2018-07-26 DIAGNOSIS — F17.200 NICOTINE DEPENDENCE: Primary | ICD-10-CM

## 2018-07-26 PROCEDURE — 90853 GROUP PSYCHOTHERAPY: CPT | Mod: S$GLB,,, | Performed by: GENERAL PRACTICE

## 2018-07-26 PROCEDURE — 99999 PR PBB SHADOW E&M-EST. PATIENT-LVL I: CPT | Mod: PBBFAC,,,

## 2018-07-31 ENCOUNTER — TELEPHONE (OUTPATIENT)
Dept: SLEEP MEDICINE | Facility: OTHER | Age: 63
End: 2018-07-31

## 2018-07-31 PROBLEM — Z12.11 SCREENING FOR COLON CANCER: Status: ACTIVE | Noted: 2018-07-31

## 2018-08-06 ENCOUNTER — TELEPHONE (OUTPATIENT)
Dept: FAMILY MEDICINE | Facility: CLINIC | Age: 63
End: 2018-08-06

## 2018-08-06 ENCOUNTER — OFFICE VISIT (OUTPATIENT)
Dept: FAMILY MEDICINE | Facility: CLINIC | Age: 63
End: 2018-08-06
Payer: MEDICARE

## 2018-08-06 VITALS
OXYGEN SATURATION: 98 % | HEART RATE: 70 BPM | BODY MASS INDEX: 26.96 KG/M2 | SYSTOLIC BLOOD PRESSURE: 170 MMHG | RESPIRATION RATE: 20 BRPM | HEIGHT: 69 IN | TEMPERATURE: 98 F | WEIGHT: 182 LBS | DIASTOLIC BLOOD PRESSURE: 88 MMHG

## 2018-08-06 DIAGNOSIS — R79.89 ABNORMAL COMPLETE BLOOD COUNT: Primary | ICD-10-CM

## 2018-08-06 DIAGNOSIS — E78.5 HYPERLIPIDEMIA LDL GOAL <70: ICD-10-CM

## 2018-08-06 DIAGNOSIS — I10 ESSENTIAL HYPERTENSION: ICD-10-CM

## 2018-08-06 DIAGNOSIS — Z23 NEED FOR TDAP VACCINATION: ICD-10-CM

## 2018-08-06 PROCEDURE — 99214 OFFICE O/P EST MOD 30 MIN: CPT | Mod: S$PBB,,, | Performed by: FAMILY MEDICINE

## 2018-08-06 PROCEDURE — 99999 PR PBB SHADOW E&M-EST. PATIENT-LVL III: CPT | Mod: PBBFAC,,, | Performed by: FAMILY MEDICINE

## 2018-08-06 PROCEDURE — 99213 OFFICE O/P EST LOW 20 MIN: CPT | Mod: PBBFAC,PN | Performed by: FAMILY MEDICINE

## 2018-08-06 RX ORDER — ROSUVASTATIN CALCIUM 20 MG/1
20 TABLET, COATED ORAL DAILY
Qty: 30 TABLET | Refills: 2 | Status: SHIPPED | OUTPATIENT
Start: 2018-08-06 | End: 2018-11-03 | Stop reason: SDUPTHER

## 2018-08-06 RX ORDER — LOSARTAN POTASSIUM 100 MG/1
100 TABLET ORAL DAILY
Qty: 30 TABLET | Refills: 2 | Status: SHIPPED | OUTPATIENT
Start: 2018-08-06 | End: 2018-11-09 | Stop reason: SDUPTHER

## 2018-08-06 NOTE — PROGRESS NOTES
"HPI:  Walter Gold is a 62 y.o. year old male that  presents for lab results. He admits that he has been using [employer] as his seasoning salt.  Chief Complaint   Patient presents with    Follow-up     lab results   .     HPI      Past Medical History:   Diagnosis Date    Coronary artery disease     Hyperlipidemia     Hypertension     MI (myocardial infarction)     Sleep apnea 5/9/2018     Social History     Social History    Marital status:      Spouse name: N/A    Number of children: N/A    Years of education: N/A     Occupational History    Not on file.     Social History Main Topics    Smoking status: Former Smoker     Packs/day: 0.50     Types: Cigarettes     Start date: 6/25/2014    Smokeless tobacco: Never Used    Alcohol use Yes    Drug use: No    Sexual activity: Not on file     Other Topics Concern    Not on file     Social History Narrative    No narrative on file     Past Surgical History:   Procedure Laterality Date    ANGIOPLASTY      COLONOSCOPY N/A 7/31/2018    Procedure: COLONOSCOPY;  Surgeon: Qamar Yun MD;  Location: [facility];  Service: Endoscopy;  Laterality: N/A;  Pt to hold Plavix 5 days prior, per Dr. Collazo    CORONARY ANGIOPLASTY WITH STENT PLACEMENT      X2 2007; 2013     Family History   Problem Relation Age of Onset    Hyperlipidemia Mother     Heart disease Brother            Review of Systems  General ROS: negative for chills, fever or weight loss  ENT ROS: negative for epistaxis, sore throat or rhinorrhea  Respiratory ROS: no cough, shortness of breath, or wheezing  Cardiovascular ROS: no chest pain or dyspnea on exertion  Gastrointestinal ROS: no abdominal pain, change in bowel habits, or black/ bloody stools    Physical Exam:  BP (!) 170/88   Pulse 70   Temp 98.4 °F (36.9 °C) (Oral)   Resp 20   Ht 5' 9" (1.753 m)   Wt 82.6 kg (182 lb)   SpO2 98%   BMI 26.88 kg/m²   General appearance: alert, cooperative, no " distress  Constitutional:Oriented to person, place, and time.appears well-developed and well-nourished.  Lungs: clear to auscultation bilaterally, no dullness to percussion bilaterally  Heart: regular rate and rhythm without rub; no displacement of the PMI , S1&S2 present  Abdomen: soft, non-tender; bowel sounds normoactive; no organomegaly  Physical Exam    LABS:    Complete Blood Count  Lab Results   Component Value Date    RBC 4.79 07/19/2018    HGB 13.2 07/19/2018    HCT 40.2 07/19/2018    MCV 83.9 07/19/2018    MCH 27.6 07/19/2018    MCHC 32.8 07/19/2018    RDW 15.6 (H) 07/19/2018     07/19/2018    MPV 9.9 07/19/2018    GRAN 54.0 04/25/2018    LYMPH 4,976 (H) 07/19/2018    LYMPH 57.2 07/19/2018    MONO 618 07/19/2018    MONO 7.1 07/19/2018     (H) 07/19/2018    BASO 70 07/19/2018    EOSINOPHIL 6.1 07/19/2018    BASOPHIL 0.8 07/19/2018    DIFFMETHOD Manual 04/25/2018       Comprehensive Metabolic Panel  Lab Results   Component Value Date     (H) 07/19/2018    BUN 18 07/19/2018    CREATININE 1.00 07/19/2018     07/19/2018    K 4.2 07/19/2018     07/19/2018    PROT 7.0 07/19/2018    ALBUMIN 4.3 07/19/2018    BILITOT 0.4 07/19/2018    AST 12 07/19/2018    ALKPHOS 50 07/19/2018    CO2 29 07/19/2018    ALT 8 (L) 07/19/2018    ANIONGAP 9 04/24/2018    EGFRNONAA 79 07/19/2018    ESTGFRAFRICA 92 07/19/2018       LIPID  Lab Results   Component Value Date    CHOL 248 (H) 07/19/2018    HDL 67 07/19/2018         TSH  Lab Results   Component Value Date    TSH 2.02 07/19/2018       Current Outpatient Prescriptions   Medication Sig Dispense Refill    aspirin 81 MG Chew Take 81 mg by mouth once daily.      clopidogrel (PLAVIX) 75 mg tablet Take 1 tablet (75 mg total) by mouth once daily. 30 tablet 11    fenofibrate 160 MG Tab Take 1 tablet (160 mg total) by mouth once daily. 30 tablet 11    metoprolol tartrate (LOPRESSOR) 50 MG tablet Take 1 tablet (50 mg total) by mouth 2 (two) times daily.  60 tablet 11    multivitamin (ONE DAILY MULTIVITAMIN) per tablet Take 1 tablet by mouth once daily.      nicotine polacrilex 2 MG Lozg Take 1 lozenge (2 mg total) by mouth as needed. 108 lozenge 0    nitroGLYCERIN (NITROSTAT) 0.4 MG SL tablet Place 1 tablet (0.4 mg total) under the tongue every 5 (five) minutes as needed for Chest pain. 25 tablet 11    rosuvastatin (CRESTOR) 20 MG tablet Take 1 tablet (20 mg total) by mouth once daily. 30 tablet 2    losartan (COZAAR) 100 MG tablet Take 1 tablet (100 mg total) by mouth once daily. 30 tablet 2     No current facility-administered medications for this visit.        Assessment:    ICD-10-CM ICD-9-CM    1. Abnormal complete blood count R79.89 790.6 CBC auto differential   2. Essential hypertension I10 401.9 losartan (COZAAR) 100 MG tablet   3. Hyperlipidemia LDL goal <70 E78.5 272.4 rosuvastatin (CRESTOR) 20 MG tablet   4. Need for Tdap vaccination Z23 V06.1          Plan:  Changed his Valsartan to losartan at a high dose due to uncontrolled BP.  Pt instructed to stop using the seasoning salt and use a no salted seasoning such as [place] Best Seasoning salt.  Follow-up in 5 weeks (on 9/13/2018).          Vanessa Kovacs MD

## 2018-08-06 NOTE — TELEPHONE ENCOUNTER
----- Message from Patricia Kovacs sent at 8/6/2018  2:06 PM CDT -----  Contact: Sujatha (wife)/[phone]  Patient's wife called to speak with the doctor in regard to his latest visit and the after visit instructions as he cannot remember.    Please call and advise.

## 2018-08-08 ENCOUNTER — TELEPHONE (OUTPATIENT)
Dept: SMOKING CESSATION | Facility: CLINIC | Age: 63
End: 2018-08-08

## 2018-08-09 ENCOUNTER — CLINICAL SUPPORT (OUTPATIENT)
Dept: SMOKING CESSATION | Facility: CLINIC | Age: 63
End: 2018-08-09
Payer: COMMERCIAL

## 2018-08-09 DIAGNOSIS — F17.200 NICOTINE DEPENDENCE: Primary | ICD-10-CM

## 2018-08-09 PROCEDURE — 99999 PR PBB SHADOW E&M-EST. PATIENT-LVL I: CPT | Mod: PBBFAC,,,

## 2018-08-09 PROCEDURE — 90853 GROUP PSYCHOTHERAPY: CPT | Mod: S$GLB,,, | Performed by: GENERAL PRACTICE

## 2018-08-09 NOTE — PROGRESS NOTES
Smoking Cessation Group Session #4    Site: [facility]  Date:  8/9/2018  Clinical Status of Patient: Outpatient   Length of Service and Code: 90 minutes - 63607   Number in Attendance: 5  Group Activities/Focus of Group:  Completion of TCRS (Tobacco Cessation Rating Scale) reviewed strategies, habitual behavior, stress, and high risk situations. Introduced stress with addition interventions, SOLVE, relaxation with interventions, nutrition, exercise, weight gain, and the importance of rewarding oneself for accomplishments toward becoming tobacco free. Open discussion of all items with interventions.     Specific session focus: Problems/Stress/Weight    Target symptoms:  withdrawal and medication side effects             The following were rated moderate (3) to severe (4) on TCRS:       Moderate 3: None     Severe 4:   None  Patient's Response to Intervention: Patient states that he is smoking 1 pack of cigarettes per week. Patient is currently using 2 mg nicotine lozenge as needed in place of cigarettes. Patient denies any negative side effects. Patient states that he has not had a cigarette two days. CO= 6 ppm.  Progress Toward Goals and Other Mental Status Changes: Patient denies any behavioral or mental status changes.      Diagnosis: Z72.0  Plan: The patient will continue with group therapy sessions and medication regimen prescribed with management by physician or by the Cessation Clinic Provider. Patient will inform Smoking Cessation Counselor of symptoms as rated high on TCRS.    Return to Clinic: 2 weeks    Quit Date: TBD  Planned Quit Date: TBD

## 2018-08-09 NOTE — Clinical Note
Patient states that he is smoking 1 pack of cigarettes per week. Patient is currently using 2 mg nicotine lozenge as needed in place of cigarettes. Patient denies any negative side effects. Patient states that he has not had a cigarette two days. CO= 6 ppm.

## 2018-08-21 ENCOUNTER — CLINICAL SUPPORT (OUTPATIENT)
Dept: SMOKING CESSATION | Facility: CLINIC | Age: 63
End: 2018-08-21
Payer: COMMERCIAL

## 2018-08-21 DIAGNOSIS — F17.200 NICOTINE DEPENDENCE: Primary | ICD-10-CM

## 2018-08-21 PROCEDURE — 99407 BEHAV CHNG SMOKING > 10 MIN: CPT | Mod: S$GLB,,, | Performed by: INTERNAL MEDICINE

## 2018-08-21 NOTE — PROGRESS NOTES
Spoke with patient's wife today in regard to smoking cessation progress for 3 month follow up, she states he is tobacco free since his heart attack. Commended patient on the accomplishment. Patient is currently enrolled in program with scheduled group visits. Informed patient's wife of benefit period, future follow ups, and contact information if any further help or support is needed. Will complete smart form for 3 month follow up on Quit attempt #1.

## 2018-08-23 ENCOUNTER — CLINICAL SUPPORT (OUTPATIENT)
Dept: SMOKING CESSATION | Facility: CLINIC | Age: 63
End: 2018-08-23
Payer: COMMERCIAL

## 2018-08-23 DIAGNOSIS — F17.200 NICOTINE DEPENDENCE: Primary | ICD-10-CM

## 2018-08-23 PROCEDURE — 90853 GROUP PSYCHOTHERAPY: CPT | Mod: S$GLB,,, | Performed by: GENERAL PRACTICE

## 2018-08-23 PROCEDURE — 99999 PR PBB SHADOW E&M-EST. PATIENT-LVL I: CPT | Mod: PBBFAC,,,

## 2018-08-23 RX ORDER — NICOTINE 7MG/24HR
1 PATCH, TRANSDERMAL 24 HOURS TRANSDERMAL DAILY
Qty: 21 PATCH | Refills: 0 | Status: SHIPPED | OUTPATIENT
Start: 2018-08-23

## 2018-08-23 NOTE — Clinical Note
Patient states that he is smoking 1 pack of cigarettes per week. He states that he has noticed an increase in how much he is smoking. Patient is currently on 2 mg nicotine lozenge as needed in place of cigarettes. Patient states that he is using 2-3 lozenges per day. Added 7 mg nicotine patches QD to patient's treatment regimen this visit. CO= 6 ppm with having had last cigarette 1 hour ago.

## 2018-08-23 NOTE — PROGRESS NOTES
Smoking Cessation Group Session #5    Site: [facility]  Date:  8/23/2018  Clinical Status of Patient: Outpatient   Length of Service and Code: 90 minutes - 24733   Number in Attendance: 6  Group Activities/Focus of Group: Completion of TCRS (Tobacco Cessation Rating Scale) reviewed strategies, habitual behavior, high risks situations, understanding urges and cravings, stress and relaxation with open discussion and additional interventions, Introduced lapses, relapses, understanding them and analyzing the situation of a lapse, conflict issues that may be linked to a lapse.      Specific session focus: Lapses and Relapses    Target symptoms:  withdrawal and medication side effects             The following were rated moderate (3) to severe (4) on TCRS:       Moderate 3: None     Severe 4:  None  Patient's Response to Intervention: Patient states that he is smoking 1 pack of cigarettes per week. He states that he has noticed an increase in how much he is smoking. Patient is currently on 2 mg nicotine lozenge as needed in place of cigarettes. Patient states that he is using 2-3 lozenges per day. Added 7 mg nicotine patches QD to patient's treatment regimen this visit. CO= 6 ppm with having had last cigarette 1 hour ago.  Progress Toward Goals and Other Mental Status Changes: Patient denies any behavioral or mental status changes.      Diagnosis: Z72.0  Plan: The patient will continue with group therapy sessions and medication regimen prescribed with management by physician or by the Cessation Clinic Provider. Patient will inform Smoking Cessation Counselor of symptoms as rated high on TCRS.    Return to Clinic: 2 weeks    Quit Date: TBD  Planned Quit Date: TBD

## 2018-08-27 ENCOUNTER — HOSPITAL ENCOUNTER (OUTPATIENT)
Dept: SLEEP MEDICINE | Facility: HOSPITAL | Age: 63
Discharge: HOME | End: 2018-08-27
Attending: FAMILY MEDICINE
Payer: MEDICARE

## 2018-08-27 DIAGNOSIS — G47.30 SLEEP APNEA, UNSPECIFIED TYPE: ICD-10-CM

## 2018-08-27 DIAGNOSIS — G47.33 OSA (OBSTRUCTIVE SLEEP APNEA): ICD-10-CM

## 2018-08-27 PROCEDURE — 95811 PR POLYSOMNOGRAPHY W/CPAP: ICD-10-PCS | Mod: 26,,, | Performed by: PSYCHIATRY & NEUROLOGY

## 2018-08-27 PROCEDURE — 95811 POLYSOM 6/>YRS CPAP 4/> PARM: CPT | Mod: 26,,, | Performed by: PSYCHIATRY & NEUROLOGY

## 2018-08-27 PROCEDURE — 95811 POLYSOM 6/>YRS CPAP 4/> PARM: CPT

## 2018-08-28 NOTE — PROGRESS NOTES
This is a CPAP study for 63 year old Walter Gold.   The procedure was explained to the patient upon arrival, this included the set-up process and what to expect during the night.   It was also explained that the test will be interpreted by a physician and the results will be sent to his PCP.   His EKG appeared to be NSR with frequent PACs  Started CPAP with a Med [employer] full face mask.  Explored pressures 4 - 15  Pt stated that CPAP was okay and that he slept good.   A thank you letter was given to the patient upon leaving the sleep lab that explains the next steps regarding the sleep study and the treatment, if needed.

## 2018-09-06 ENCOUNTER — CLINICAL SUPPORT (OUTPATIENT)
Dept: SMOKING CESSATION | Facility: CLINIC | Age: 63
End: 2018-09-06
Payer: COMMERCIAL

## 2018-09-06 DIAGNOSIS — F17.200 NICOTINE DEPENDENCE: Primary | ICD-10-CM

## 2018-09-06 PROCEDURE — 99999 PR PBB SHADOW E&M-EST. PATIENT-LVL I: CPT | Mod: PBBFAC,,,

## 2018-09-06 PROCEDURE — 99407 BEHAV CHNG SMOKING > 10 MIN: CPT | Mod: S$GLB,,, | Performed by: GENERAL PRACTICE

## 2018-09-12 PROBLEM — G47.33 OSA (OBSTRUCTIVE SLEEP APNEA): Status: ACTIVE | Noted: 2018-05-09

## 2018-09-13 ENCOUNTER — OFFICE VISIT (OUTPATIENT)
Dept: FAMILY MEDICINE | Facility: CLINIC | Age: 63
End: 2018-09-13
Payer: MEDICARE

## 2018-09-13 VITALS
OXYGEN SATURATION: 98 % | TEMPERATURE: 98 F | DIASTOLIC BLOOD PRESSURE: 88 MMHG | HEIGHT: 69 IN | WEIGHT: 183 LBS | RESPIRATION RATE: 18 BRPM | HEART RATE: 67 BPM | SYSTOLIC BLOOD PRESSURE: 168 MMHG | BODY MASS INDEX: 27.11 KG/M2

## 2018-09-13 DIAGNOSIS — Z87.891 HISTORY OF SMOKING: ICD-10-CM

## 2018-09-13 DIAGNOSIS — G47.33 OSA (OBSTRUCTIVE SLEEP APNEA): Primary | ICD-10-CM

## 2018-09-13 DIAGNOSIS — I10 ESSENTIAL HYPERTENSION: ICD-10-CM

## 2018-09-13 PROCEDURE — 99214 OFFICE O/P EST MOD 30 MIN: CPT | Mod: S$PBB,,, | Performed by: FAMILY MEDICINE

## 2018-09-13 PROCEDURE — 99214 OFFICE O/P EST MOD 30 MIN: CPT | Mod: PBBFAC,PN | Performed by: FAMILY MEDICINE

## 2018-09-13 PROCEDURE — 99999 PR PBB SHADOW E&M-EST. PATIENT-LVL IV: CPT | Mod: PBBFAC,,, | Performed by: FAMILY MEDICINE

## 2018-09-13 RX ORDER — METOPROLOL TARTRATE 100 MG/1
100 TABLET ORAL 2 TIMES DAILY
Qty: 60 TABLET | Refills: 2 | Status: SHIPPED | OUTPATIENT
Start: 2018-09-13 | End: 2019-02-10 | Stop reason: SDUPTHER

## 2018-09-13 NOTE — PROGRESS NOTES
HPI:  Walter Gold is a 63 y.o. year old male that  Presents for blood pressure check.  Also patient just completed his CPAP titration but has not received his machine.  Patient states he is trying to eat better and follow guidelines for improved lifestyle modifications.  Chief Complaint   Patient presents with    Follow-up     blood pressure check    .     HPI      Past Medical History:   Diagnosis Date    Coronary artery disease     Hyperlipidemia     Hypertension     MI (myocardial infarction)     Sleep apnea 5/9/2018     Social History     Socioeconomic History    Marital status:      Spouse name: Not on file    Number of children: Not on file    Years of education: Not on file    Highest education level: Not on file   Social Needs    Financial resource strain: Not on file    Food insecurity - worry: Not on file    Food insecurity - inability: Not on file    Transportation needs - medical: Not on file    Transportation needs - non-medical: Not on file   Occupational History    Not on file   Tobacco Use    Smoking status: Former Smoker     Packs/day: 0.50     Types: Cigarettes     Start date: 6/25/2014    Smokeless tobacco: Never Used   Substance and Sexual Activity    Alcohol use: Yes    Drug use: No    Sexual activity: Not on file   Other Topics Concern    Not on file   Social History Narrative    Not on file     Past Surgical History:   Procedure Laterality Date    ANGIOPLASTY      COLONOSCOPY N/A 7/31/2018    Procedure: COLONOSCOPY;  Surgeon: Qamar Yun MD;  Location: [facility] ENDO;  Service: Endoscopy;  Laterality: N/A;  Pt to hold Plavix 5 days prior, per Dr. Collazo    COLONOSCOPY N/A 7/31/2018    Performed by Qamar Yun MD at [facility] ENDO    CORONARY ANGIOPLASTY WITH STENT PLACEMENT      X2 2007; 2013     Family History   Problem Relation Age of Onset    Hyperlipidemia Mother     Heart disease Brother            Review of Systems  General ROS: negative  "for chills, fever or weight loss  ENT ROS: negative for epistaxis, sore throat or rhinorrhea  Respiratory ROS: no cough, shortness of breath, or wheezing  Cardiovascular ROS: no chest pain or dyspnea on exertion  Gastrointestinal ROS: no abdominal pain, change in bowel habits, or black/ bloody stools    Physical Exam:  BP (!) 168/88   Pulse 67   Temp 98 °F (36.7 °C) (Oral)   Resp 18   Ht 5' 9" (1.753 m)   Wt 83 kg (183 lb)   SpO2 98%   BMI 27.02 kg/m²   General appearance: alert, cooperative, no distress  Constitutional:Oriented to person, place, and time.appears well-developed and well-nourished.  HEENT: Normocephalic, atraumatic, neck symmetrical, no nasal discharge, TM- clear bilaterally  Lungs: clear to auscultation bilaterally, no dullness to percussion bilaterally  Heart: regular rate and rhythm without rub; no displacement of the PMI , S1&S2 present  Abdomen: soft, non-tender; bowel sounds normoactive; no organomegaly  Physical Exam    LABS:    Complete Blood Count  Lab Results   Component Value Date    RBC 4.79 07/19/2018    HGB 13.2 07/19/2018    HCT 40.2 07/19/2018    MCV 83.9 07/19/2018    MCH 27.6 07/19/2018    MCHC 32.8 07/19/2018    RDW 15.6 (H) 07/19/2018     07/19/2018    MPV 9.9 07/19/2018    GRAN 54.0 04/25/2018    LYMPH 4,976 (H) 07/19/2018    LYMPH 57.2 07/19/2018    MONO 618 07/19/2018    MONO 7.1 07/19/2018     (H) 07/19/2018    BASO 70 07/19/2018    EOSINOPHIL 6.1 07/19/2018    BASOPHIL 0.8 07/19/2018    DIFFMETHOD Manual 04/25/2018       Comprehensive Metabolic Panel  Lab Results   Component Value Date     (H) 07/19/2018    BUN 18 07/19/2018    CREATININE 1.00 07/19/2018     07/19/2018    K 4.2 07/19/2018     07/19/2018    PROT 7.0 07/19/2018    ALBUMIN 4.3 07/19/2018    BILITOT 0.4 07/19/2018    AST 12 07/19/2018    ALKPHOS 50 07/19/2018    CO2 29 07/19/2018    ALT 8 (L) 07/19/2018    ANIONGAP 9 04/24/2018    EGFRNONAA 79 07/19/2018    ESTGFRAFRICA 92 " 07/19/2018       LIPID  Lab Results   Component Value Date    CHOL 248 (H) 07/19/2018    HDL 67 07/19/2018         TSH  Lab Results   Component Value Date    TSH 2.02 07/19/2018       Current Outpatient Medications   Medication Sig Dispense Refill    aspirin 81 MG Chew Take 81 mg by mouth once daily.      clopidogrel (PLAVIX) 75 mg tablet Take 1 tablet (75 mg total) by mouth once daily. 30 tablet 11    fenofibrate 160 MG Tab Take 1 tablet (160 mg total) by mouth once daily. 30 tablet 11    losartan (COZAAR) 100 MG tablet Take 1 tablet (100 mg total) by mouth once daily. 30 tablet 2    metoprolol tartrate (LOPRESSOR) 100 MG tablet Take 1 tablet (100 mg total) by mouth 2 (two) times daily. 60 tablet 2    multivitamin (ONE DAILY MULTIVITAMIN) per tablet Take 1 tablet by mouth once daily.      nicotine (NICODERM CQ) 7 mg/24 hr Place 1 patch onto the skin once daily. 21 patch 0    nitroGLYCERIN (NITROSTAT) 0.4 MG SL tablet Place 1 tablet (0.4 mg total) under the tongue every 5 (five) minutes as needed for Chest pain. 25 tablet 11    rosuvastatin (CRESTOR) 20 MG tablet Take 1 tablet (20 mg total) by mouth once daily. 30 tablet 2    nicotine polacrilex 2 MG Lozg Take 1 lozenge (2 mg total) by mouth as needed. 108 lozenge 0     No current facility-administered medications for this visit.        Assessment:    ICD-10-CM ICD-9-CM    1. PARIS (obstructive sleep apnea) G47.33 327.23 CPAP FOR HOME USE   2. Essential hypertension I10 401.9 metoprolol tartrate (LOPRESSOR) 100 MG tablet   3. History of smoking Z87.891 V15.82 US Abdominal Aorta         Plan:  Increased metoprolol to 100 mg twice a day.  Follow-up in 2 weeks (on 9/27/2018).  Will follow up in 2 weeks          Vanessa Kovacs MD

## 2018-09-20 ENCOUNTER — CLINICAL SUPPORT (OUTPATIENT)
Dept: SMOKING CESSATION | Facility: CLINIC | Age: 63
End: 2018-09-20
Payer: COMMERCIAL

## 2018-09-20 DIAGNOSIS — F17.200 NICOTINE DEPENDENCE: Primary | ICD-10-CM

## 2018-09-20 PROCEDURE — 90853 GROUP PSYCHOTHERAPY: CPT | Mod: S$GLB,,, | Performed by: GENERAL PRACTICE

## 2018-09-20 NOTE — Clinical Note
Patient states that he is smoking 3-5 cpd. Patient currently ordered on 7 mg nicotine patch QD and 2 mg nicotine lozenge as needed in place of cigarettes. Patient states that he is still not using nicotine patches at this time and only using lozenges. Patient reports no negative side effects. CO= 6 ppm with having had last cigarette 1.5 hours ago.

## 2018-09-20 NOTE — PROGRESS NOTES
Site: [facility]  Date:  9/20/2018  Clinical Status of Patient: Outpatient   Length of Service and Code: 90 minutes - 45859   Number in Attendance: 7  Group Activities/Focus of Group:  Sharing last weeks challenges, triggers, and coping activities to keep making progress toward cessation, completion of TCRS (Tobacco Cessation Rating Scale) learned addiction model, personal reasons for quitting, medications, goals, quit date.  Target symptoms:  withdrawal and medication side effects             The following were rated moderate (3) to severe (4) on TCRS:       Moderate 3: None     Severe 4:   None  Patient's Response to Intervention: Patient states that he is smoking 3-5 cpd. Patient currently ordered on 7 mg nicotine patch QD and 2 mg nicotine lozenge as needed in place of cigarettes. Patient states that he is still not using nicotine patches at this time and only using lozenges. Patient reports no negative side effects. CO= 6 ppm with having had last cigarette 1.5 hours ago.    Progress Toward Goals and Other Mental Status Changes: The patient denies any abnormal behavioral or mental changes at this time.       Diagnosis: Z72.0  Plan: The patient will continue with group therapy sessions and medication regimen prescribed with management by physician or Cessation Clinic Provider. Patient will inform Smoking Clinic Cessation Counselor of symptoms as rated high on TCRS.    Return to Clinic: 2 weeks

## 2018-09-21 ENCOUNTER — CLINICAL SUPPORT (OUTPATIENT)
Dept: FAMILY MEDICINE | Facility: CLINIC | Age: 63
End: 2018-09-21
Payer: MEDICARE

## 2018-09-21 ENCOUNTER — HOSPITAL ENCOUNTER (OUTPATIENT)
Dept: RADIOLOGY | Facility: HOSPITAL | Age: 63
Discharge: HOME | End: 2018-09-21
Attending: FAMILY MEDICINE
Payer: MEDICARE

## 2018-09-21 VITALS — SYSTOLIC BLOOD PRESSURE: 142 MMHG | DIASTOLIC BLOOD PRESSURE: 82 MMHG

## 2018-09-21 DIAGNOSIS — Z87.891 HISTORY OF SMOKING: ICD-10-CM

## 2018-09-21 PROCEDURE — 76775 US EXAM ABDO BACK WALL LIM: CPT | Mod: TC,PO

## 2018-09-21 PROCEDURE — 99211 OFF/OP EST MAY X REQ PHY/QHP: CPT | Mod: PBBFAC,25,PN

## 2018-09-21 PROCEDURE — 99999 PR PBB SHADOW E&M-EST. PATIENT-LVL I: CPT | Mod: PBBFAC,,,

## 2018-10-11 ENCOUNTER — OFFICE VISIT (OUTPATIENT)
Dept: FAMILY MEDICINE | Facility: CLINIC | Age: 63
End: 2018-10-11
Payer: MEDICARE

## 2018-10-11 ENCOUNTER — CLINICAL SUPPORT (OUTPATIENT)
Dept: SMOKING CESSATION | Facility: CLINIC | Age: 63
End: 2018-10-11
Payer: COMMERCIAL

## 2018-10-11 VITALS
BODY MASS INDEX: 26.81 KG/M2 | WEIGHT: 181 LBS | HEIGHT: 69 IN | SYSTOLIC BLOOD PRESSURE: 172 MMHG | OXYGEN SATURATION: 98 % | TEMPERATURE: 98 F | RESPIRATION RATE: 18 BRPM | DIASTOLIC BLOOD PRESSURE: 90 MMHG | HEART RATE: 58 BPM

## 2018-10-11 DIAGNOSIS — F17.200 NICOTINE DEPENDENCE: Primary | ICD-10-CM

## 2018-10-11 DIAGNOSIS — I10 ESSENTIAL HYPERTENSION: ICD-10-CM

## 2018-10-11 DIAGNOSIS — G47.33 OBSTRUCTIVE SLEEP APNEA SYNDROME: Primary | ICD-10-CM

## 2018-10-11 DIAGNOSIS — I70.90 ATHEROSCLEROSIS: ICD-10-CM

## 2018-10-11 DIAGNOSIS — E78.5 HYPERLIPIDEMIA LDL GOAL <70: ICD-10-CM

## 2018-10-11 PROCEDURE — 99214 OFFICE O/P EST MOD 30 MIN: CPT | Mod: PBBFAC,PN | Performed by: FAMILY MEDICINE

## 2018-10-11 PROCEDURE — 99407 BEHAV CHNG SMOKING > 10 MIN: CPT | Mod: S$GLB,,, | Performed by: GENERAL PRACTICE

## 2018-10-11 PROCEDURE — 99999 PR PBB SHADOW E&M-EST. PATIENT-LVL IV: CPT | Mod: PBBFAC,,, | Performed by: FAMILY MEDICINE

## 2018-10-11 PROCEDURE — 99214 OFFICE O/P EST MOD 30 MIN: CPT | Mod: S$PBB,,, | Performed by: FAMILY MEDICINE

## 2018-10-11 RX ORDER — METOPROLOL TARTRATE 25 MG/1
25 TABLET, FILM COATED ORAL 2 TIMES DAILY
Qty: 60 TABLET | Refills: 2 | Status: SHIPPED | OUTPATIENT
Start: 2018-10-11 | End: 2018-11-09 | Stop reason: SDUPTHER

## 2018-10-11 NOTE — PROGRESS NOTES
HPI:  Walter Gold is a 63 y.o. year old male that  presents for test results.  Patient admits that he has not done well with his lifestyle modifications at this point.  Patient looking for results of his CPAP and sleep study.  Chief Complaint   Patient presents with    Follow-up     US results    Blood Pressure Check   .     HPI      Past Medical History:   Diagnosis Date    Coronary artery disease     Hyperlipidemia     Hypertension     MI (myocardial infarction)     Sleep apnea 5/9/2018     Social History     Socioeconomic History    Marital status:      Spouse name: Not on file    Number of children: Not on file    Years of education: Not on file    Highest education level: Not on file   Social Needs    Financial resource strain: Not on file    Food insecurity - worry: Not on file    Food insecurity - inability: Not on file    Transportation needs - medical: Not on file    Transportation needs - non-medical: Not on file   Occupational History    Not on file   Tobacco Use    Smoking status: Former Smoker     Packs/day: 0.50     Types: Cigarettes     Start date: 6/25/2014    Smokeless tobacco: Never Used   Substance and Sexual Activity    Alcohol use: Yes    Drug use: No    Sexual activity: Not on file   Other Topics Concern    Not on file   Social History Narrative    Not on file     Past Surgical History:   Procedure Laterality Date    ANGIOPLASTY      COLONOSCOPY N/A 7/31/2018    Procedure: COLONOSCOPY;  Surgeon: Qamar Yun MD;  Location: [facility];  Service: Endoscopy;  Laterality: N/A;  Pt to hold Plavix 5 days prior, per Dr. Collazo    COLONOSCOPY N/A 7/31/2018    Performed by Qamar Yun MD at [facility]O    CORONARY ANGIOPLASTY WITH STENT PLACEMENT      X2 2007; 2013     Family History   Problem Relation Age of Onset    Hyperlipidemia Mother     Heart disease Brother            Review of Systems  General ROS: negative for chills, fever or weight  "loss  ENT ROS: negative for epistaxis, sore throat or rhinorrhea  Respiratory ROS: no cough, shortness of breath, or wheezing  Cardiovascular ROS: no chest pain or dyspnea on exertion  Gastrointestinal ROS: no abdominal pain, change in bowel habits, or black/ bloody stools    Physical Exam:  BP (!) 172/90   Pulse (!) 58   Temp 98.1 °F (36.7 °C) (Oral)   Resp 18   Ht 5' 9" (1.753 m)   Wt 82.1 kg (181 lb)   SpO2 98%   BMI 26.73 kg/m²   General appearance: alert, cooperative, no distress  Constitutional:Oriented to person, place, and time.appears well-developed and well-nourished.  HEENT: Normocephalic, atraumatic, neck symmetrical, no nasal discharge, TM- clear bilaterally  Lungs: clear to auscultation bilaterally, no dullness to percussion bilaterally  Heart: regular rate and rhythm without rub; no displacement of the PMI , S1&S2 present  Abdomen: soft, non-tender; bowel sounds normoactive; no organomegaly  Physical Exam    LABS:    Complete Blood Count  Lab Results   Component Value Date    RBC 4.79 07/19/2018    HGB 13.2 07/19/2018    HCT 40.2 07/19/2018    MCV 83.9 07/19/2018    MCH 27.6 07/19/2018    MCHC 32.8 07/19/2018    RDW 15.6 (H) 07/19/2018     07/19/2018    MPV 9.9 07/19/2018    GRAN 54.0 04/25/2018    LYMPH 4,976 (H) 07/19/2018    LYMPH 57.2 07/19/2018    MONO 618 07/19/2018    MONO 7.1 07/19/2018     (H) 07/19/2018    BASO 70 07/19/2018    EOSINOPHIL 6.1 07/19/2018    BASOPHIL 0.8 07/19/2018    DIFFMETHOD Manual 04/25/2018       Comprehensive Metabolic Panel  Lab Results   Component Value Date     (H) 07/19/2018    BUN 18 07/19/2018    CREATININE 1.00 07/19/2018     07/19/2018    K 4.2 07/19/2018     07/19/2018    PROT 7.0 07/19/2018    ALBUMIN 4.3 07/19/2018    BILITOT 0.4 07/19/2018    AST 12 07/19/2018    ALKPHOS 50 07/19/2018    CO2 29 07/19/2018    ALT 8 (L) 07/19/2018    ANIONGAP 9 04/24/2018    EGFRNONAA 79 07/19/2018    ESTGFRAFRICA 92 07/19/2018 "       LIPID  Lab Results   Component Value Date    CHOL 248 (H) 07/19/2018    HDL 67 07/19/2018         TSH  Lab Results   Component Value Date    TSH 2.02 07/19/2018       Current Outpatient Medications   Medication Sig Dispense Refill    aspirin 81 MG Chew Take 81 mg by mouth once daily.      clopidogrel (PLAVIX) 75 mg tablet Take 1 tablet (75 mg total) by mouth once daily. 30 tablet 11    fenofibrate 160 MG Tab Take 1 tablet (160 mg total) by mouth once daily. 30 tablet 11    losartan (COZAAR) 100 MG tablet Take 1 tablet (100 mg total) by mouth once daily. 30 tablet 2    metoprolol tartrate (LOPRESSOR) 100 MG tablet Take 1 tablet (100 mg total) by mouth 2 (two) times daily. 60 tablet 2    multivitamin (ONE DAILY MULTIVITAMIN) per tablet Take 1 tablet by mouth once daily.      nicotine (NICODERM CQ) 7 mg/24 hr Place 1 patch onto the skin once daily. 21 patch 0    nitroGLYCERIN (NITROSTAT) 0.4 MG SL tablet Place 1 tablet (0.4 mg total) under the tongue every 5 (five) minutes as needed for Chest pain. 25 tablet 11    rosuvastatin (CRESTOR) 20 MG tablet Take 1 tablet (20 mg total) by mouth once daily. 30 tablet 2    metoprolol tartrate (LOPRESSOR) 25 MG tablet Take 1 tablet (25 mg total) by mouth 2 (two) times daily. 60 tablet 2    nicotine polacrilex 2 MG Lozg Take 1 lozenge (2 mg total) by mouth as needed. 108 lozenge 0     No current facility-administered medications for this visit.        Assessment:    ICD-10-CM ICD-9-CM    1. Obstructive sleep apnea syndrome G47.33 327.23 CPAP FOR HOME USE   2. Essential hypertension I10 401.9 metoprolol tartrate (LOPRESSOR) 25 MG tablet   3. Hyperlipidemia LDL goal <70 E78.5 272.4 Lipid panel   4. Atherosclerosis I70.90 440.9 Lipid panel         Plan:  Patient will come in for blood pressure check in the next 2 weeks to check and make sure new medication is working well  Will add 25 mg twice a day  the patient who is already taking her metoprolol 100 mg twice a  day.  Follow-up in 3 months (on 1/9/2019).          Vanessa Kovacs MD

## 2018-10-11 NOTE — MEDICAL/APP STUDENT
Subjective:       Patient ID: Walter Gold is a 63 y.o. male.    Chief Complaint: Follow-up (US results) and Blood Pressure Check    63 year old male with a PMH of CAD, HLD, HTN, and PARIS presents for follow up of sleep study, ultrasound results, and blood pressure check. Patient was given results of abdominal aortic aneursym screening which were negative. Diameter of aorta was measured at 2.3 cm with mild atherosclerosis. Sleep study results advise using a CPAP at night. Patient was counseled on this and a CPAP was ordered.     Patient disclosed his difficult to control hypertension which today is increased from his last visit at 172/90. Patient is exercising regularly at cardiac therapy as well as dietary changes. No symptoms noticed by patient. Discussed with patient increasing his metoprolol dosage.     :   Review of Systems   Constitutional: Negative.    HENT: Negative.    Respiratory: Negative.    Cardiovascular: Negative.    Gastrointestinal: Negative.    Genitourinary: Negative.    Musculoskeletal: Negative.        Objective:      Physical Exam   Constitutional: He is oriented to person, place, and time. He appears well-developed and well-nourished. No distress.   HENT:   Head: Normocephalic and atraumatic.   Cardiovascular: Normal rate, regular rhythm, normal heart sounds, intact distal pulses and normal pulses.   Pulmonary/Chest: Effort normal and breath sounds normal.   Abdominal: Soft. Bowel sounds are normal. He exhibits no distension and no mass. There is no tenderness.   Musculoskeletal: Normal range of motion.   Neurological: He is alert and oriented to person, place, and time.   Skin: He is not diaphoretic.   Psychiatric: He has a normal mood and affect. His behavior is normal.   Vitals reviewed.      Assessment:       1. Obstructive sleep apnea syndrome    2. Essential hypertension    3. Hyperlipidemia LDL goal <70    4. Atherosclerosis        Plan:         63 year old male with a PMH of CAD, HLD,  HTN, and PARIS presenting for test results.     1) Ongoing primary hypertension  - 172/90 today.   - continue patient's losartan at 100 mg/daily  - increase metoprolol to 125 mg bid.   - advise patient to continue to take BP at home.  - follow up in 3 months or sooner if symptoms noticed.  2) Ultrasound results   - patient advised of results - mild atherosclerotic changes. Diameter 2.3 cm.   - no follow up needed.   3) Sleep study  - results given demonstrating PARIS  -CPAP ordered  4) Health Maintenance   - patient refused flu shot.   - will get tetanus, zoster, and pneumococcal at [facility] due to insurance coverage   - lipid panel ordered.   - lifestyle dietary changes encouraged  5) Smoking cessation   - quit in 5/2018.    - encourage patient to continue smoking cessation.

## 2018-11-03 DIAGNOSIS — E78.5 HYPERLIPIDEMIA LDL GOAL <70: ICD-10-CM

## 2018-11-05 RX ORDER — ROSUVASTATIN CALCIUM 20 MG/1
TABLET, COATED ORAL
Qty: 30 TABLET | Refills: 2 | Status: SHIPPED | OUTPATIENT
Start: 2018-11-05 | End: 2018-12-03 | Stop reason: SDUPTHER

## 2018-11-09 DIAGNOSIS — I10 ESSENTIAL HYPERTENSION: ICD-10-CM

## 2018-11-11 DIAGNOSIS — I10 ESSENTIAL HYPERTENSION: ICD-10-CM

## 2018-11-12 RX ORDER — LOSARTAN POTASSIUM 100 MG/1
100 TABLET ORAL DAILY
Qty: 30 TABLET | Refills: 2 | Status: SHIPPED | OUTPATIENT
Start: 2018-11-12 | End: 2019-11-12

## 2018-11-12 RX ORDER — LOSARTAN POTASSIUM 100 MG/1
TABLET ORAL
Qty: 90 TABLET | Refills: 0 | Status: SHIPPED | OUTPATIENT
Start: 2018-11-12 | End: 2019-03-06 | Stop reason: SDUPTHER

## 2018-11-12 RX ORDER — METOPROLOL TARTRATE 25 MG/1
25 TABLET, FILM COATED ORAL 2 TIMES DAILY
Qty: 60 TABLET | Refills: 2 | Status: SHIPPED | OUTPATIENT
Start: 2018-11-12 | End: 2019-11-12

## 2018-11-15 ENCOUNTER — CLINICAL SUPPORT (OUTPATIENT)
Dept: SMOKING CESSATION | Facility: CLINIC | Age: 63
End: 2018-11-15
Payer: COMMERCIAL

## 2018-11-15 DIAGNOSIS — F17.200 NICOTINE DEPENDENCE: Primary | ICD-10-CM

## 2018-11-15 PROCEDURE — 90853 GROUP PSYCHOTHERAPY: CPT | Mod: S$GLB,,, | Performed by: GENERAL PRACTICE

## 2018-11-27 NOTE — PROGRESS NOTES
Site: [facility]  Date:  11/15/2018  Clinical Status of Patient: Outpatient   Length of Service and Code: 90 minutes - 34115   Number in Attendance: 6  Group Activities/Focus of Group: Completion of TCRS (Tobacco Cessation Rating Scale) reviewed strategies, controlling environment, cues, triggers, new goals set. Introduced high risk situations with preparation interventions, caffeine similarities with withdrawal issues of habit and nicotine, alcohol, understanding urges, cravings, stress and relaxation. Open discussion with intervention discussion.    Target symptoms:  withdrawal and medication side effects             The following were rated moderate (3) to severe (4) on TCRS:       Moderate 3: None     Severe 4:  None  Patient's Response to Intervention: Patient states that he is smoking about the same. Patient ordered on 7 mg nicotine patch QD and 2 mg nicotine lozenges as needed in place of cigarettes, but only uses the lozenges. CO= 6 ppm with having had last cigarette 2 hours ago.   Progress Toward Goals and Other Mental Status Changes: The patient denies any abnormal behavioral or mental changes at this time.       Diagnosis: Z72.0  Plan: The patient will continue with group therapy sessions and medication regimen prescribed with management by physician or Cessation Clinic Provider. Patient will inform Smoking Clinic Cessation Counselor of symptoms as rated high on TCRS.    Return to Clinic: 2 weeks

## 2018-12-03 DIAGNOSIS — E78.5 HYPERLIPIDEMIA LDL GOAL <70: ICD-10-CM

## 2018-12-03 RX ORDER — ROSUVASTATIN CALCIUM 20 MG/1
20 TABLET, COATED ORAL DAILY
Qty: 90 TABLET | Refills: 0 | Status: SHIPPED | OUTPATIENT
Start: 2018-12-03 | End: 2019-04-29 | Stop reason: SDUPTHER

## 2018-12-12 ENCOUNTER — TELEPHONE (OUTPATIENT)
Dept: SMOKING CESSATION | Facility: CLINIC | Age: 63
End: 2018-12-12

## 2018-12-12 NOTE — TELEPHONE ENCOUNTER
Patient states not available to talk at this time due to driving. Will call back at a later date, regarding smoking cessation quit 1 episode.

## 2019-01-03 ENCOUNTER — TELEPHONE (OUTPATIENT)
Dept: SMOKING CESSATION | Facility: CLINIC | Age: 64
End: 2019-01-03

## 2019-02-10 DIAGNOSIS — I10 ESSENTIAL HYPERTENSION: ICD-10-CM

## 2019-02-11 RX ORDER — METOPROLOL TARTRATE 100 MG/1
TABLET ORAL
Qty: 60 TABLET | Refills: 2 | Status: SHIPPED | OUTPATIENT
Start: 2019-02-11

## 2019-03-06 DIAGNOSIS — I10 ESSENTIAL HYPERTENSION: ICD-10-CM

## 2019-03-06 RX ORDER — LOSARTAN POTASSIUM 100 MG/1
TABLET ORAL
Qty: 90 TABLET | Refills: 0 | Status: SHIPPED | OUTPATIENT
Start: 2019-03-06

## 2019-04-10 ENCOUNTER — PATIENT MESSAGE (OUTPATIENT)
Dept: CARDIOLOGY | Facility: CLINIC | Age: 64
End: 2019-04-10

## 2019-04-29 DIAGNOSIS — E78.5 HYPERLIPIDEMIA LDL GOAL <70: ICD-10-CM

## 2019-04-29 RX ORDER — ROSUVASTATIN CALCIUM 20 MG/1
TABLET, COATED ORAL
Qty: 90 TABLET | Refills: 0 | Status: SHIPPED | OUTPATIENT
Start: 2019-04-29

## 2019-06-11 ENCOUNTER — TELEPHONE (OUTPATIENT)
Dept: SMOKING CESSATION | Facility: CLINIC | Age: 64
End: 2019-06-11

## 2019-07-06 DIAGNOSIS — I10 ESSENTIAL HYPERTENSION: ICD-10-CM

## 2019-07-06 RX ORDER — METOPROLOL TARTRATE 25 MG/1
TABLET, FILM COATED ORAL
Qty: 60 TABLET | Refills: 2 | OUTPATIENT
Start: 2019-07-06

## 2019-07-06 RX ORDER — METOPROLOL TARTRATE 100 MG/1
TABLET ORAL
Qty: 60 TABLET | Refills: 2 | OUTPATIENT
Start: 2019-07-06

## 2019-09-10 ENCOUNTER — PATIENT OUTREACH (OUTPATIENT)
Dept: ADMINISTRATIVE | Facility: HOSPITAL | Age: 64
End: 2019-09-10

## 2019-09-10 ENCOUNTER — PATIENT MESSAGE (OUTPATIENT)
Dept: ADMINISTRATIVE | Facility: HOSPITAL | Age: 64
End: 2019-09-10

## 2021-07-01 ENCOUNTER — PATIENT MESSAGE (OUTPATIENT)
Dept: ADMINISTRATIVE | Facility: OTHER | Age: 66
End: 2021-07-01

## 2023-05-19 NOTE — TELEPHONE ENCOUNTER
----- Message from Constanza Xiao sent at 5/3/2018  3:12 PM CDT -----  Contact: [phone]/wife  Patient's wife is returning your call. Please advise.     Spoke with patient wife Angelita she explained they went to Dr Martínez at [facility] and they did not take his insurance and was referred to Dr Prashanth Mcdaniels whom they already saw and need the referral for the transplant .  awaitng on a response form [facility] regarding getting an approval or denial form that team